# Patient Record
Sex: MALE | Race: WHITE | Employment: FULL TIME | URBAN - METROPOLITAN AREA
[De-identification: names, ages, dates, MRNs, and addresses within clinical notes are randomized per-mention and may not be internally consistent; named-entity substitution may affect disease eponyms.]

---

## 2019-09-16 ENCOUNTER — APPOINTMENT (OUTPATIENT)
Dept: GENERAL RADIOLOGY | Age: 24
End: 2019-09-16
Payer: COMMERCIAL

## 2019-09-16 ENCOUNTER — HOSPITAL ENCOUNTER (EMERGENCY)
Age: 24
Discharge: HOME | End: 2019-09-16
Attending: EMERGENCY MEDICINE
Payer: COMMERCIAL

## 2019-09-16 VITALS
RESPIRATION RATE: 14 BRPM | HEIGHT: 76 IN | SYSTOLIC BLOOD PRESSURE: 125 MMHG | OXYGEN SATURATION: 98 % | HEART RATE: 81 BPM | BODY MASS INDEX: 24.26 KG/M2 | DIASTOLIC BLOOD PRESSURE: 72 MMHG | TEMPERATURE: 99.1 F | WEIGHT: 199.2 LBS

## 2019-09-16 DIAGNOSIS — S60.222A CONTUSION OF LEFT HAND, INITIAL ENCOUNTER: Primary | ICD-10-CM

## 2019-09-16 PROCEDURE — 99283 EMERGENCY DEPT VISIT LOW MDM: CPT

## 2019-09-16 PROCEDURE — 73130 X-RAY EXAM OF HAND: CPT

## 2019-09-16 PROCEDURE — 6370000000 HC RX 637 (ALT 250 FOR IP): Performed by: EMERGENCY MEDICINE

## 2019-09-16 RX ADMIN — IBUPROFEN 600 MG: 200 TABLET, FILM COATED ORAL at 15:44

## 2019-09-16 ASSESSMENT — PAIN DESCRIPTION - LOCATION: LOCATION: HAND

## 2019-09-16 ASSESSMENT — PAIN SCALES - GENERAL
PAINLEVEL_OUTOF10: 2
PAINLEVEL_OUTOF10: 2

## 2019-09-16 ASSESSMENT — PAIN DESCRIPTION - ORIENTATION: ORIENTATION: LEFT

## 2019-09-16 ASSESSMENT — PAIN DESCRIPTION - PAIN TYPE: TYPE: ACUTE PAIN

## 2019-09-16 NOTE — ED PROVIDER NOTES
CHIEF COMPLAINT  Hand Pain      HISTORY OF PRESENT ILLNESS  Gsiele Miller is a 30 y.o. male, who presents to the ED with moderate left hand pain after accidental injury last night patient was carrying a laundry basket and tripped hitting his left hand against the door complains of moderate pain in the hand the base of the second and third fingers no weakness no numbness no paresthesias. No other complaints,modifying factors or associated symptoms. Review of Systems    I have reviewed the following from the nursing documentation. History reviewed. No pertinent past medical history. History reviewed. No pertinent surgical history. History reviewed. No pertinent family history.   Social History     Socioeconomic History    Marital status: Single     Spouse name: Not on file    Number of children: Not on file    Years of education: Not on file    Highest education level: Not on file   Occupational History    Not on file   Social Needs    Financial resource strain: Not on file    Food insecurity:     Worry: Not on file     Inability: Not on file    Transportation needs:     Medical: Not on file     Non-medical: Not on file   Tobacco Use    Smoking status: Never Smoker    Smokeless tobacco: Current User     Types: Snuff   Substance and Sexual Activity    Alcohol use: Yes     Comment: 5-6 drinks weekly    Drug use: Never    Sexual activity: Not on file   Lifestyle    Physical activity:     Days per week: Not on file     Minutes per session: Not on file    Stress: Not on file   Relationships    Social connections:     Talks on phone: Not on file     Gets together: Not on file     Attends Mu-ism service: Not on file     Active member of club or organization: Not on file     Attends meetings of clubs or organizations: Not on file     Relationship status: Not on file    Intimate partner violence:     Fear of current or ex partner: Not on file     Emotionally abused: Not on file     Physically

## 2020-05-01 ENCOUNTER — HOSPITAL ENCOUNTER (EMERGENCY)
Age: 25
Discharge: HOME | End: 2020-05-01
Payer: COMMERCIAL

## 2020-05-01 ENCOUNTER — APPOINTMENT (OUTPATIENT)
Dept: GENERAL RADIOLOGY | Age: 25
End: 2020-05-01
Payer: COMMERCIAL

## 2020-05-01 VITALS
SYSTOLIC BLOOD PRESSURE: 126 MMHG | DIASTOLIC BLOOD PRESSURE: 78 MMHG | OXYGEN SATURATION: 100 % | HEART RATE: 88 BPM | TEMPERATURE: 98.2 F | RESPIRATION RATE: 16 BRPM

## 2020-05-01 PROCEDURE — 99283 EMERGENCY DEPT VISIT LOW MDM: CPT

## 2020-05-01 PROCEDURE — 73140 X-RAY EXAM OF FINGER(S): CPT

## 2020-05-01 RX ORDER — IBUPROFEN 600 MG/1
600 TABLET ORAL ONCE
Status: DISCONTINUED | OUTPATIENT
Start: 2020-05-01 | End: 2020-05-01 | Stop reason: HOSPADM

## 2020-05-01 RX ORDER — IBUPROFEN 600 MG/1
600 TABLET ORAL EVERY 6 HOURS PRN
Qty: 30 TABLET | Refills: 0 | Status: SHIPPED | OUTPATIENT
Start: 2020-05-01 | End: 2021-10-15

## 2020-05-01 ASSESSMENT — PAIN DESCRIPTION - PAIN TYPE: TYPE: ACUTE PAIN

## 2020-05-01 ASSESSMENT — PAIN DESCRIPTION - DESCRIPTORS: DESCRIPTORS: ACHING

## 2020-05-01 ASSESSMENT — PAIN DESCRIPTION - ORIENTATION: ORIENTATION: LEFT

## 2020-05-01 ASSESSMENT — PAIN SCALES - GENERAL: PAINLEVEL_OUTOF10: 5

## 2020-05-01 ASSESSMENT — PAIN DESCRIPTION - LOCATION: LOCATION: FINGER (COMMENT WHICH ONE)

## 2020-05-01 NOTE — ED PROVIDER NOTES
[address] [facility]  eMERGENCY dEPARTMENT eNCOUnter        Pt Name: Chela Calvo  MRN: [id number]  [place] 1995  Date of evaluation: 5/1/2020  Provider: Nataliia Pal PA-C  PCP: No primary care provider on file. ED Attending: Patricia Cutler MD    Patient was not seen by the ED attending  History is provided by the patient    CHIEF COMPLAINT:  Finger Pain (left ring finger pain)      HISTORY OF PRESENT ILLNESS:  Chela Calvo is a 30 y.o. male who presents to the ED via private vehicle with complaints of finger injury. The patient reports accidentally closing his left ring finger in a car door approximately 1 hour prior to arrival.  He states he initially had severe pain and some swelling to the digit. He admits that symptoms have started to improve. Pain is more moderate at this time and is rated 5/10 in triage. Pain is described as both \"aching\" as well as \"burning\". There is no break in the skin from this event. He reports being right-hand dominant. He took nothing for symptom relief prior to coming to the ED. No other complaints or injuries, modifying factors or associated symptoms. Nursing notes reviewed. History reviewed. No pertinent past medical history. History reviewed. No pertinent surgical history. History reviewed. No pertinent family history.   Social History     Socioeconomic History    Marital status: Single     Spouse name: Not on file    Number of children: Not on file    Years of education: Not on file    Highest education level: Not on file   Occupational History    Not on file   Social Needs    Financial resource strain: Not on file    Food insecurity     Worry: Not on file     Inability: Not on file    Transportation needs     Medical: Not on file     Non-medical: Not on file   Tobacco Use    Smoking status: Never Smoker    Smokeless tobacco: Current User     Types: Snuff   Substance and Sexual Activity    Alcohol use: Yes     Comment: 5-6 drinks weekly  Drug use: Never    Sexual activity: Not on file   Lifestyle    Physical activity     Days per week: Not on file     Minutes per session: Not on file    Stress: Not on file   Relationships    Social connections     Talks on phone: Not on file     Gets together: Not on file     Attends Pentecostalism service: Not on file     Active member of club or organization: Not on file     Attends meetings of clubs or organizations: Not on file     Relationship status: Not on file    Intimate partner violence     Fear of current or ex partner: Not on file     Emotionally abused: Not on file     Physically abused: Not on file     Forced sexual activity: Not on file   Other Topics Concern    Not on file   Social History Narrative    Not on file     Current Facility-Administered Medications   Medication Dose Route Frequency Provider Last Rate Last Dose    ibuprofen (ADVIL;MOTRIN) tablet 600 mg  600 mg Oral Once NIKITA Ragsdale         Current Outpatient Medications   Medication Sig Dispense Refill    ibuprofen (IBU) 600 MG tablet Take 1 tablet by mouth every 6 hours as needed for Pain 30 tablet 0     No Known Allergies    REVIEW OF SYSTEMS:  6 systems reviewed, pertinent positives per HPI otherwise noted to be negative. PHYSICAL EXAM:  /78   Pulse 88   Temp 98.2 °F (36.8 °C) (Oral)   Resp 16   SpO2 100%   CONSTITUTIONAL: Awake and alert. Well-developed. Well-nourished. Non-toxic. Cooperative. No acute distress. HENT: Normocephalic. Atraumatic. External ears normal, without discharge. Nose normal. Mucous membranes moist.  EYES: Conjunctiva non-injected. No scleral icterus. PERRL. EOM's grossly intact. NECK: Supple. Normal ROM. CARDIOVASCULAR: Normal heart rate. Intact distal pulses. PULMONARY/CHEST WALL: Breathing is unlabored. Equal, symmetric chest rise. Speaking comfortably in full sentences. ABDOMEN: Nondistended  MUSKULOSKELETAL: Left hand: No deformities.   Tenderness to the ring finger at the middle phalanx. No significant soft tissue swelling noted. No crepitus to palpate. Normal ROM. SKIN: Warm and dry. Skin intact. NEUROLOGICAL: Alert and oriented x 3. Strength is 5/5 in all extremities and sensation is intact. PSYCHIATRIC: Normal affect    Labs:    None    RADIOLOGY:    All x-ray studies are viewed/reviewed by me. Formal interpretations per the radiologist are as follows:    Xr Finger Left (min 2 Views)    Result Date: 5/1/2020  LEFT FOURTH FINGER 3 VIEWS INDICATION: Injury. FINDINGS: 3 views of the left fourth finger were obtained. There is no evidence of any fracture or dislocation. Joint spaces are maintained. 1. No acute osseous abnormality. ED COURSE/MDM:  Patient was given the following medications:  Medications   ibuprofen (ADVIL;MOTRIN) tablet 600 mg (600 mg Oral Not Given 5/1/20 0344)       I have evaluated this patient here in the ED. patient describes closing his left ring finger in a car door. Outwardly there is no real objective sign of injury. He is tender over the middle phalanx of the left ring finger. An x-ray of the digit is negative. Patient given ibuprofen in the ED and he will be discharged on this. I recommended ice. He can follow-up with primary care as needed and he will be referral to that end. Patient was given scripts for the following medications. I counseled patient how to take these medications. Discharge Medication List as of 5/1/2020  2:42 PM      START taking these medications    Details   ibuprofen (IBU) 600 MG tablet Take 1 tablet by mouth every 6 hours as needed for Pain, Disp-30 tablet, R-0Print           I estimate there is LOW risk for ACUTE FRACTURE, COMPARTMENT SYNDROME, DEEP VENOUS THROMBOSIS, SEPTIC ARTHRITIS, TENDON OR NEUROVASCULAR INJURY, thus I consider the discharge disposition reasonable.  Maricarmen Owens and I have discussed the diagnosis and risks, and we agree with discharging home to follow-up with their primary doctor or the referral orthopedist. We also discussed returning to the Emergency Department immediately if new or worsening symptoms occur. We have discussed the symptoms which are most concerning (e.g., changing or worsening pain, numbness, weakness) that necessitate immediate return. CLINICAL IMPRESSION:  1. Contusion of left ring finger without damage to nail, initial encounter        Blood pressure 126/78, pulse 88, temperature 98.2 °F (36.8 °C), temperature source Oral, resp. rate 16, SpO2 100 %. PATIENT REFERRED TO:  Tyson Hernandez  [phone]            DISPOSITION  Patient was discharged to home in good condition.           Kuldeep Moranma  05/01/20 1301

## 2020-07-23 ENCOUNTER — OFFICE VISIT (OUTPATIENT)
Dept: PRIMARY CARE CLINIC | Age: 25
End: 2020-07-23
Payer: COMMERCIAL

## 2020-07-23 PROCEDURE — 99211 OFF/OP EST MAY X REQ PHY/QHP: CPT | Performed by: NURSE PRACTITIONER

## 2020-07-23 NOTE — PROGRESS NOTES
Raul Holden received a viral test for COVID-19. They were educated on isolation and quarantine as appropriate. For any symptoms, they were directed to seek care from their PCP, given contact information to establish with a doctor, directed to an urgent care or the emergency room.

## 2020-07-28 LAB
SARS-COV-2: NOT DETECTED
SOURCE: NORMAL

## 2020-10-06 ENCOUNTER — HOSPITAL ENCOUNTER (EMERGENCY)
Age: 25
Discharge: HOME | End: 2020-10-06
Attending: EMERGENCY MEDICINE
Payer: COMMERCIAL

## 2020-10-06 VITALS
WEIGHT: 199 LBS | OXYGEN SATURATION: 99 % | TEMPERATURE: 98.2 F | RESPIRATION RATE: 12 BRPM | DIASTOLIC BLOOD PRESSURE: 86 MMHG | BODY MASS INDEX: 24.23 KG/M2 | HEART RATE: 87 BPM | HEIGHT: 76 IN | SYSTOLIC BLOOD PRESSURE: 141 MMHG

## 2020-10-06 PROCEDURE — 99282 EMERGENCY DEPT VISIT SF MDM: CPT

## 2020-10-06 PROCEDURE — 6360000002 HC RX W HCPCS: Performed by: EMERGENCY MEDICINE

## 2020-10-06 PROCEDURE — 96372 THER/PROPH/DIAG INJ SC/IM: CPT

## 2020-10-06 RX ORDER — KETOROLAC TROMETHAMINE 30 MG/ML
60 INJECTION, SOLUTION INTRAMUSCULAR; INTRAVENOUS ONCE
Status: COMPLETED | OUTPATIENT
Start: 2020-10-06 | End: 2020-10-06

## 2020-10-06 RX ORDER — NAPROXEN 500 MG/1
500 TABLET ORAL 2 TIMES DAILY
Qty: 20 TABLET | Refills: 0 | Status: SHIPPED | OUTPATIENT
Start: 2020-10-06 | End: 2021-10-15

## 2020-10-06 RX ORDER — TRAMADOL HYDROCHLORIDE 50 MG/1
50 TABLET ORAL EVERY 6 HOURS PRN
Qty: 10 TABLET | Refills: 0 | Status: SHIPPED | OUTPATIENT
Start: 2020-10-06 | End: 2020-10-09

## 2020-10-06 RX ORDER — METHOCARBAMOL 750 MG/1
750 TABLET, FILM COATED ORAL 3 TIMES DAILY
Qty: 15 TABLET | Refills: 0 | Status: SHIPPED | OUTPATIENT
Start: 2020-10-06 | End: 2020-10-11

## 2020-10-06 RX ADMIN — KETOROLAC TROMETHAMINE 60 MG: 30 INJECTION, SOLUTION INTRAMUSCULAR at 19:25

## 2020-10-06 ASSESSMENT — PAIN SCALES - GENERAL
PAINLEVEL_OUTOF10: 6
PAINLEVEL_OUTOF10: 6

## 2020-10-06 ASSESSMENT — PAIN DESCRIPTION - LOCATION: LOCATION: SHOULDER

## 2020-10-06 ASSESSMENT — PAIN DESCRIPTION - ORIENTATION: ORIENTATION: LEFT

## 2020-10-06 NOTE — ED TRIAGE NOTES
Patient states he was tightening something at work and felt pain to left shoulder. Had surgery to this shoulder in January of this year.

## 2020-10-06 NOTE — ED PROVIDER NOTES
[facility] EMERGENCY DEPT VISIT      Patient Identification  Krystian Rodriguez is a 30 y.o. male. Chief Complaint   Shoulder Pain      History of Present Illness: This is a  30 y.o. male who presents ambulatory  to the ED with complaints of left shoulder pain. Patient states that last winter he had a shoulder labrum repair done. He had been doing well until today at work when he pulled hard on a handle and felt immediate pain from shoulder down arm. Hurts to abduct arm now. No numbness or weakness. Took flexeril without relief. No chest or neck pain. History reviewed. No pertinent past medical history. Past Surgical History:   Procedure Laterality Date    SHOULDER SURGERY         No current facility-administered medications for this encounter. Current Outpatient Medications:     naproxen (NAPROSYN) 500 MG tablet, Take 1 tablet by mouth 2 times daily for 10 days, Disp: 20 tablet, Rfl: 0    methocarbamol (ROBAXIN-750) 750 MG tablet, Take 1 tablet by mouth 3 times daily for 5 days, Disp: 15 tablet, Rfl: 0    traMADol (ULTRAM) 50 MG tablet, Take 1 tablet by mouth every 6 hours as needed for Pain for up to 3 days. , Disp: 10 tablet, Rfl: 0    ibuprofen (IBU) 600 MG tablet, Take 1 tablet by mouth every 6 hours as needed for Pain, Disp: 30 tablet, Rfl: 0    No Known Allergies    Social History     Socioeconomic History    Marital status: Single     Spouse name: Not on file    Number of children: Not on file    Years of education: Not on file    Highest education level: Not on file   Occupational History    Not on file   Social Needs    Financial resource strain: Not on file    Food insecurity     Worry: Not on file     Inability: Not on file    Transportation needs     Medical: Not on file     Non-medical: Not on file   Tobacco Use    Smoking status: Never Smoker    Smokeless tobacco: Current User     Types: Snuff   Substance and Sexual Activity    Alcohol use: Yes     Comment: 5-6 drinks weekly    Drug use: Never    Sexual activity: Not on file   Lifestyle    Physical activity     Days per week: Not on file     Minutes per session: Not on file    Stress: Not on file   Relationships    Social connections     Talks on phone: Not on file     Gets together: Not on file     Attends Christian service: Not on file     Active member of club or organization: Not on file     Attends meetings of clubs or organizations: Not on file     Relationship status: Not on file    Intimate partner violence     Fear of current or ex partner: Not on file     Emotionally abused: Not on file     Physically abused: Not on file     Forced sexual activity: Not on file   Other Topics Concern    Not on file   Social History Narrative    Not on file       Nursing Notes Reviewed      ROS:  General: no fever  RESP: no cough, no shortness of breath  Musculoskeletal: + arthralgia, no myalgia, no back pain, no neck pain, no joint swelling  NEURO: no headache, no numbness, no weakness  DERM: no rash, no erythema, no ecchymosis, no wounds      PHYSICAL EXAM:  GENERAL APPEARANCE: Radha Guzman is in no acute respiratory distress. Awake and alert. VITAL SIGNS:   ED Triage Vitals [10/06/20 1850]   Enc Vitals Group      BP (!) 141/86      Pulse 87      Resp 12      Temp 98.2 °F (36.8 °C)      Temp Source Oral      SpO2 99 %      Weight 199 lb (90.3 kg)      Height 6' 4\" (1.93 m)      Head Circumference       Peak Flow       Pain Score       Pain Loc       Pain Edu? Excl. in [address]? HEAD: Normocephalic, atraumatic. EYES:  Extraocular muscles are intact. Conjunctivas are pink. Negative scleral icterus. ENT:  Mucous membranes are moist.     NECK: Nontender and supple. CHEST: Clear to auscultation bilaterally. No rales, rhonchi, or wheezing. Chest wall is nontender  HEART:  Regular rate and rhythm. No murmurs. Strong and equal pulses in the upper and lower extremities. ABDOMEN: Soft,  nondistended, positive bowel sounds.  abdomen is nontender. MUSCULOSKELETAL:  Active range of motion of the upper and lower extremities. No edema. Left shoulder tender over anterior joint line. Active abduction markedly reduced. Pain with passive abduction to 50 degrees. Lesser degree of pain with flexion and extension of shoulder. No scapular tenderness. No midline cervical or thoracic spine tenderness. Strong radial pulse. Good ROM at elbow and wrist with full strength  NEUROLOGICAL: Awake, alert and oriented x 3. Power intact in the upper and lower extremities. Sensation intact. DERMATOLOGIC: No petechiae, rashes, or ecchymoses. ED COURSE AND MEDICAL DECISION MAKING:      Treatment in the department:  Patient received   Medications   ketorolac (TORADOL) injection 60 mg (60 mg Intramuscular Given 10/6/20 1925)     He reports having a sling and swathe at home to use    Medical decision making:  Patient presents the emergency department with acute left shoulder pain after pulling on a heavy lever. There was no direct trauma to the shoulder or fall. There is no evidence of dislocation on exam.  He is neurovascular intact distally. He does have decreased range of motion primarily on abduction and may have suffered a rotator cuff tear or recurrent injury to his labrum or disruption of prior surgical treatment. I did not feel Nadara Fall would be helpful as not expecting fracture or dislocation. Will refer back to dr Britt Norwood.  Patient understands MRI may be needed if pain persists    I estimate there is LOW risk for FRACTURE, DISLOCATION, COMPARTMENT SYNDROME, DEEP VENOUS THROMBOSIS, SEPTIC ARTHRITIS, OSTEOMYELITIS, TENDON OR NEUROVASCULAR INJURY, thus I consider the discharge disposition reasonable.  Jonas Carrera and I have discussed the diagnosis and risks, and we agree with discharging home to follow-up with their primary doctor or the referral orthopedist. We also discussed returning to the Emergency Department immediately if new or worsening symptoms occur. Clinical Impression:  1. Left shoulder strain, initial encounter        Dispo:  Patient will be discharged  at this time. Patient was informed of this decision and agrees with plan. I have discussed lab and xray findings with patient and they understand. Questions were answered to the best of my ability. Discharge vitals:  Blood pressure (!) 141/86, pulse 87, temperature 98.2 °F (36.8 °C), temperature source Oral, resp. rate 12, height 6' 4\" (1.93 m), weight 199 lb (90.3 kg), SpO2 99 %. Prescriptions given:   Discharge Medication List as of 10/6/2020  7:47 PM      START taking these medications    Details   naproxen (NAPROSYN) 500 MG tablet Take 1 tablet by mouth 2 times daily for 10 days, Disp-20 tablet,R-0Print      methocarbamol (ROBAXIN-750) 750 MG tablet Take 1 tablet by mouth 3 times daily for 5 days, Disp-15 tablet,R-0Print      traMADol (ULTRAM) 50 MG tablet Take 1 tablet by mouth every 6 hours as needed for Pain for up to 3 days. , Disp-10 tablet,R-0Print               This chart was created using dragon voice recognition software.         Wen Sun MD  10/07/20 2202

## 2021-10-15 ENCOUNTER — APPOINTMENT (OUTPATIENT)
Dept: CT IMAGING | Age: 26
DRG: 387 | End: 2021-10-15
Payer: COMMERCIAL

## 2021-10-15 ENCOUNTER — HOSPITAL ENCOUNTER (INPATIENT)
Age: 26
LOS: 2 days | Discharge: HOME | DRG: 387 | End: 2021-10-17
Attending: INTERNAL MEDICINE | Admitting: INTERNAL MEDICINE
Payer: COMMERCIAL

## 2021-10-15 DIAGNOSIS — R10.9 ABDOMINAL PAIN, UNSPECIFIED ABDOMINAL LOCATION: Primary | ICD-10-CM

## 2021-10-15 DIAGNOSIS — R11.2 INTRACTABLE VOMITING WITH NAUSEA, UNSPECIFIED VOMITING TYPE: ICD-10-CM

## 2021-10-15 LAB
A/G RATIO: 1.7 (ref 1.1–2.2)
ALBUMIN SERPL-MCNC: 4.4 G/DL (ref 3.4–5)
ALP BLD-CCNC: 76 U/L (ref 40–129)
ALT SERPL-CCNC: 56 U/L (ref 10–40)
ANION GAP SERPL CALCULATED.3IONS-SCNC: 12 MMOL/L (ref 3–16)
AST SERPL-CCNC: 31 U/L (ref 15–37)
BASOPHILS ABSOLUTE: 0.1 K/UL (ref 0–0.2)
BASOPHILS RELATIVE PERCENT: 0.5 %
BILIRUB SERPL-MCNC: 0.6 MG/DL (ref 0–1)
BILIRUBIN URINE: NEGATIVE
BLOOD, URINE: NEGATIVE
BUN BLDV-MCNC: 10 MG/DL (ref 7–20)
C-REACTIVE PROTEIN: <3 MG/L (ref 0–5.1)
CALCIUM SERPL-MCNC: 9.3 MG/DL (ref 8.3–10.6)
CHLORIDE BLD-SCNC: 101 MMOL/L (ref 99–110)
CLARITY: CLEAR
CO2: 25 MMOL/L (ref 21–32)
COLOR: YELLOW
CREAT SERPL-MCNC: 1 MG/DL (ref 0.9–1.3)
EOSINOPHILS ABSOLUTE: 0 K/UL (ref 0–0.6)
EOSINOPHILS RELATIVE PERCENT: 0.1 %
GFR AFRICAN AMERICAN: >60
GFR NON-AFRICAN AMERICAN: >60
GLOBULIN: 2.6 G/DL
GLUCOSE BLD-MCNC: 87 MG/DL (ref 70–99)
GLUCOSE URINE: NEGATIVE MG/DL
HCT VFR BLD CALC: 48.3 % (ref 40.5–52.5)
HEMOGLOBIN: 16.3 G/DL (ref 13.5–17.5)
KETONES, URINE: NEGATIVE MG/DL
LEUKOCYTE ESTERASE, URINE: NEGATIVE
LIPASE: 25 U/L (ref 13–60)
LYMPHOCYTES ABSOLUTE: 3.6 K/UL (ref 1–5.1)
LYMPHOCYTES RELATIVE PERCENT: 32.7 %
MCH RBC QN AUTO: 30.6 PG (ref 26–34)
MCHC RBC AUTO-ENTMCNC: 33.7 G/DL (ref 31–36)
MCV RBC AUTO: 90.9 FL (ref 80–100)
MICROSCOPIC EXAMINATION: NORMAL
MONOCYTES ABSOLUTE: 0.8 K/UL (ref 0–1.3)
MONOCYTES RELATIVE PERCENT: 6.8 %
NEUTROPHILS ABSOLUTE: 6.6 K/UL (ref 1.7–7.7)
NEUTROPHILS RELATIVE PERCENT: 59.9 %
NITRITE, URINE: NEGATIVE
PDW BLD-RTO: 13.3 % (ref 12.4–15.4)
PH UA: 6.5 (ref 5–8)
PLATELET # BLD: 241 K/UL (ref 135–450)
PMV BLD AUTO: 7.6 FL (ref 5–10.5)
POTASSIUM REFLEX MAGNESIUM: 4 MMOL/L (ref 3.5–5.1)
PROTEIN UA: NEGATIVE MG/DL
RBC # BLD: 5.31 M/UL (ref 4.2–5.9)
SEDIMENTATION RATE, ERYTHROCYTE: 4 MM/HR (ref 0–15)
SODIUM BLD-SCNC: 138 MMOL/L (ref 136–145)
SPECIFIC GRAVITY UA: >1.03 (ref 1–1.03)
TOTAL PROTEIN: 7 G/DL (ref 6.4–8.2)
URINE REFLEX TO CULTURE: NORMAL
URINE TYPE: NORMAL
UROBILINOGEN, URINE: 0.2 E.U./DL
WBC # BLD: 11.1 K/UL (ref 4–11)

## 2021-10-15 PROCEDURE — 2580000003 HC RX 258: Performed by: INTERNAL MEDICINE

## 2021-10-15 PROCEDURE — 6360000002 HC RX W HCPCS: Performed by: INTERNAL MEDICINE

## 2021-10-15 PROCEDURE — 86140 C-REACTIVE PROTEIN: CPT

## 2021-10-15 PROCEDURE — 96375 TX/PRO/DX INJ NEW DRUG ADDON: CPT

## 2021-10-15 PROCEDURE — 99284 EMERGENCY DEPT VISIT MOD MDM: CPT

## 2021-10-15 PROCEDURE — 81003 URINALYSIS AUTO W/O SCOPE: CPT

## 2021-10-15 PROCEDURE — 80053 COMPREHEN METABOLIC PANEL: CPT

## 2021-10-15 PROCEDURE — 85652 RBC SED RATE AUTOMATED: CPT

## 2021-10-15 PROCEDURE — 6360000004 HC RX CONTRAST MEDICATION: Performed by: PHYSICIAN ASSISTANT

## 2021-10-15 PROCEDURE — 1200000000 HC SEMI PRIVATE

## 2021-10-15 PROCEDURE — 83690 ASSAY OF LIPASE: CPT

## 2021-10-15 PROCEDURE — 6360000002 HC RX W HCPCS: Performed by: PHYSICIAN ASSISTANT

## 2021-10-15 PROCEDURE — 6370000000 HC RX 637 (ALT 250 FOR IP): Performed by: INTERNAL MEDICINE

## 2021-10-15 PROCEDURE — 36415 COLL VENOUS BLD VENIPUNCTURE: CPT

## 2021-10-15 PROCEDURE — 96374 THER/PROPH/DIAG INJ IV PUSH: CPT

## 2021-10-15 PROCEDURE — 2580000003 HC RX 258: Performed by: PHYSICIAN ASSISTANT

## 2021-10-15 PROCEDURE — 74177 CT ABD & PELVIS W/CONTRAST: CPT

## 2021-10-15 PROCEDURE — 85025 COMPLETE CBC W/AUTO DIFF WBC: CPT

## 2021-10-15 RX ORDER — ACETAMINOPHEN 325 MG/1
650 TABLET ORAL EVERY 6 HOURS PRN
Status: DISCONTINUED | OUTPATIENT
Start: 2021-10-15 | End: 2021-10-17 | Stop reason: HOSPADM

## 2021-10-15 RX ORDER — MORPHINE SULFATE 2 MG/ML
2 INJECTION, SOLUTION INTRAMUSCULAR; INTRAVENOUS ONCE
Status: COMPLETED | OUTPATIENT
Start: 2021-10-15 | End: 2021-10-15

## 2021-10-15 RX ORDER — SODIUM CHLORIDE 0.9 % (FLUSH) 0.9 %
5-40 SYRINGE (ML) INJECTION EVERY 12 HOURS SCHEDULED
Status: DISCONTINUED | OUTPATIENT
Start: 2021-10-15 | End: 2021-10-17 | Stop reason: HOSPADM

## 2021-10-15 RX ORDER — 0.9 % SODIUM CHLORIDE 0.9 %
1000 INTRAVENOUS SOLUTION INTRAVENOUS ONCE
Status: COMPLETED | OUTPATIENT
Start: 2021-10-15 | End: 2021-10-15

## 2021-10-15 RX ORDER — ONDANSETRON 4 MG/1
4 TABLET, ORALLY DISINTEGRATING ORAL EVERY 8 HOURS PRN
Status: DISCONTINUED | OUTPATIENT
Start: 2021-10-15 | End: 2021-10-17 | Stop reason: HOSPADM

## 2021-10-15 RX ORDER — SODIUM CHLORIDE 0.9 % (FLUSH) 0.9 %
5-40 SYRINGE (ML) INJECTION PRN
Status: DISCONTINUED | OUTPATIENT
Start: 2021-10-15 | End: 2021-10-17 | Stop reason: HOSPADM

## 2021-10-15 RX ORDER — ACETAMINOPHEN 650 MG/1
650 SUPPOSITORY RECTAL EVERY 6 HOURS PRN
Status: DISCONTINUED | OUTPATIENT
Start: 2021-10-15 | End: 2021-10-17 | Stop reason: HOSPADM

## 2021-10-15 RX ORDER — PREDNISONE 10 MG/1
40 TABLET ORAL DAILY
COMMUNITY

## 2021-10-15 RX ORDER — METHYLPREDNISOLONE SODIUM SUCCINATE 125 MG/2ML
60 INJECTION, POWDER, LYOPHILIZED, FOR SOLUTION INTRAMUSCULAR; INTRAVENOUS ONCE
Status: DISCONTINUED | OUTPATIENT
Start: 2021-10-15 | End: 2021-10-15

## 2021-10-15 RX ORDER — METHYLPREDNISOLONE SODIUM SUCCINATE 40 MG/ML
20 INJECTION, POWDER, LYOPHILIZED, FOR SOLUTION INTRAMUSCULAR; INTRAVENOUS EVERY 8 HOURS
Status: DISCONTINUED | OUTPATIENT
Start: 2021-10-15 | End: 2021-10-17 | Stop reason: HOSPADM

## 2021-10-15 RX ORDER — ACETAMINOPHEN 500 MG
500 TABLET ORAL EVERY 6 HOURS PRN
COMMUNITY

## 2021-10-15 RX ORDER — METHYLPREDNISOLONE SODIUM SUCCINATE 125 MG/2ML
125 INJECTION, POWDER, LYOPHILIZED, FOR SOLUTION INTRAMUSCULAR; INTRAVENOUS ONCE
Status: DISCONTINUED | OUTPATIENT
Start: 2021-10-15 | End: 2021-10-15

## 2021-10-15 RX ORDER — MORPHINE SULFATE 4 MG/ML
4 INJECTION, SOLUTION INTRAMUSCULAR; INTRAVENOUS ONCE
Status: COMPLETED | OUTPATIENT
Start: 2021-10-15 | End: 2021-10-15

## 2021-10-15 RX ORDER — SODIUM CHLORIDE 9 MG/ML
25 INJECTION, SOLUTION INTRAVENOUS PRN
Status: DISCONTINUED | OUTPATIENT
Start: 2021-10-15 | End: 2021-10-17 | Stop reason: HOSPADM

## 2021-10-15 RX ORDER — HYDROCODONE BITARTRATE AND ACETAMINOPHEN 5; 325 MG/1; MG/1
1 TABLET ORAL EVERY 6 HOURS PRN
Status: DISCONTINUED | OUTPATIENT
Start: 2021-10-15 | End: 2021-10-17 | Stop reason: HOSPADM

## 2021-10-15 RX ORDER — POLYETHYLENE GLYCOL 3350 17 G/17G
17 POWDER, FOR SOLUTION ORAL DAILY PRN
Status: DISCONTINUED | OUTPATIENT
Start: 2021-10-15 | End: 2021-10-17 | Stop reason: HOSPADM

## 2021-10-15 RX ORDER — PROMETHAZINE HYDROCHLORIDE 12.5 MG/1
12.5 TABLET ORAL EVERY 6 HOURS PRN
Status: ON HOLD | COMMUNITY
End: 2021-10-17 | Stop reason: SDUPTHER

## 2021-10-15 RX ORDER — ONDANSETRON 2 MG/ML
4 INJECTION INTRAMUSCULAR; INTRAVENOUS EVERY 6 HOURS PRN
Status: DISCONTINUED | OUTPATIENT
Start: 2021-10-15 | End: 2021-10-17 | Stop reason: HOSPADM

## 2021-10-15 RX ORDER — ONDANSETRON 2 MG/ML
4 INJECTION INTRAMUSCULAR; INTRAVENOUS ONCE
Status: COMPLETED | OUTPATIENT
Start: 2021-10-15 | End: 2021-10-15

## 2021-10-15 RX ADMIN — SODIUM CHLORIDE, PRESERVATIVE FREE 10 ML: 5 INJECTION INTRAVENOUS at 21:27

## 2021-10-15 RX ADMIN — MORPHINE SULFATE 2 MG: 2 INJECTION, SOLUTION INTRAMUSCULAR; INTRAVENOUS at 16:00

## 2021-10-15 RX ADMIN — METHYLPREDNISOLONE SODIUM SUCCINATE 60 MG: 125 INJECTION, POWDER, FOR SOLUTION INTRAMUSCULAR; INTRAVENOUS at 14:50

## 2021-10-15 RX ADMIN — Medication 25 MG: at 15:32

## 2021-10-15 RX ADMIN — HYDROCODONE BITARTRATE AND ACETAMINOPHEN 1 TABLET: 5; 325 TABLET ORAL at 21:26

## 2021-10-15 RX ADMIN — IOPAMIDOL 75 ML: 755 INJECTION, SOLUTION INTRAVENOUS at 11:51

## 2021-10-15 RX ADMIN — ONDANSETRON 4 MG: 2 INJECTION INTRAMUSCULAR; INTRAVENOUS at 21:26

## 2021-10-15 RX ADMIN — ONDANSETRON 4 MG: 2 INJECTION INTRAMUSCULAR; INTRAVENOUS at 11:45

## 2021-10-15 RX ADMIN — MORPHINE SULFATE 4 MG: 4 INJECTION INTRAVENOUS at 11:44

## 2021-10-15 RX ADMIN — METHYLPREDNISOLONE SODIUM SUCCINATE 20 MG: 40 INJECTION, POWDER, FOR SOLUTION INTRAMUSCULAR; INTRAVENOUS at 23:52

## 2021-10-15 RX ADMIN — SODIUM CHLORIDE 1000 ML: 9 INJECTION, SOLUTION INTRAVENOUS at 11:44

## 2021-10-15 ASSESSMENT — PAIN DESCRIPTION - PAIN TYPE
TYPE: ACUTE PAIN

## 2021-10-15 ASSESSMENT — ENCOUNTER SYMPTOMS
EYE REDNESS: 0
VOMITING: 1
CHOKING: 0
ABDOMINAL PAIN: 1
NAUSEA: 1
SHORTNESS OF BREATH: 0
APNEA: 0
EYE DISCHARGE: 0
FACIAL SWELLING: 0
BACK PAIN: 0

## 2021-10-15 ASSESSMENT — PAIN SCALES - GENERAL
PAINLEVEL_OUTOF10: 9
PAINLEVEL_OUTOF10: 5
PAINLEVEL_OUTOF10: 6
PAINLEVEL_OUTOF10: 8

## 2021-10-15 ASSESSMENT — PAIN DESCRIPTION - LOCATION
LOCATION: ABDOMEN

## 2021-10-15 ASSESSMENT — PAIN DESCRIPTION - DESCRIPTORS
DESCRIPTORS: BURNING

## 2021-10-15 ASSESSMENT — PAIN DESCRIPTION - ORIENTATION
ORIENTATION: LOWER

## 2021-10-15 ASSESSMENT — PAIN - FUNCTIONAL ASSESSMENT
PAIN_FUNCTIONAL_ASSESSMENT: PREVENTS OR INTERFERES SOME ACTIVE ACTIVITIES AND ADLS
PAIN_FUNCTIONAL_ASSESSMENT: PREVENTS OR INTERFERES SOME ACTIVE ACTIVITIES AND ADLS

## 2021-10-15 ASSESSMENT — PAIN DESCRIPTION - ONSET
ONSET: ON-GOING
ONSET: SUDDEN
ONSET: ON-GOING

## 2021-10-15 ASSESSMENT — PAIN DESCRIPTION - FREQUENCY
FREQUENCY: CONTINUOUS

## 2021-10-15 ASSESSMENT — PAIN DESCRIPTION - PROGRESSION
CLINICAL_PROGRESSION: NOT CHANGED
CLINICAL_PROGRESSION: NOT CHANGED
CLINICAL_PROGRESSION: GRADUALLY WORSENING

## 2021-10-15 NOTE — CONSULTS
GASTROENTEROLOGY INPATIENT CONSULTATION        IDENTIFYING DATA/REASON FOR CONSULTATION   PATIENT:  Omar Leyva  MRN:  [id number]  ADMIT DATE: 10/15/2021  TIME OF EVALUATION: 10/15/2021 11:47 AM  HOSPITAL STAY:   LOS: 0 days     REASON FOR CONSULTATION:  Abdominal pain    HISTORY OF PRESENT ILLNESS   Omar Leyva is a 1 [facility] [facility] y.o. male who presented on 10/15/2021 with nausea, vomiting and abdominal pain. Pt was recently seen at [address] [place] [address] on 10/5 and had a CT performed that showed long segment of abnormally thickened small bowel within the right lower quadrant, extending to the terminal ileum, suspicious for inflammatory bowel disease. He was prescribed 6 day course of steroids and discharged with close follow up with GI. He saw Dr. Russell River earlier this week. Stool studies were ordered and he was restarted on Prednisone 40 mg daily and scheduled for a colonoscopy. He called the office today with persistent nausea and vomiting and inability to keep medication down. He was encouraged to come to the ED for evaluation. Pt describes pain along his right side and epigastric area. He initially had loose watery stools however more recently his stools have been semi foamed and greenish brown in color with mucous. He denies blood in his stool or melenic appearing stools. He has a history of passing bloody stools while in the [employer]. He denies any other close contacts with similar symptoms. He denies any recent antibiotic use. Repeat CT A/P today shows no acute abnormalities. CRP normal.  Sed rate pending. ALT 56 otherwise LFTs normal.  Lipase normal.          PAST MEDICAL, SURGICAL, FAMILY, and SOCIAL HISTORY   No past medical history on file. Past Surgical History:   Procedure Laterality Date    SHOULDER SURGERY       No family history on file.   Social History     Socioeconomic History    Marital status: Single     Spouse name: Not on file    Number of children: Not on file    Years of education: Not on file  Highest education level: Not on file   Occupational History    Not on file   Tobacco Use    Smoking status: Never Smoker    Smokeless tobacco: Current User     Types: Snuff   Substance and Sexual Activity    Alcohol use: Yes     Comment: 5-6 drinks weekly    Drug use: Never    Sexual activity: Not on file   Other Topics Concern    Not on file   Social History Narrative    Not on file     Social Determinants of Health     Financial Resource Strain:     Difficulty of Paying Living Expenses:    Food Insecurity:     Worried About Running Out of Food in the Last Year:     [address] in the Last Year:    Transportation Needs:     Lack of Transportation (Medical):  Lack of Transportation (Non-Medical):    Physical Activity:     Days of Exercise per Week:     Minutes of Exercise per Session:    Stress:     Feeling of Stress :    Social Connections:     Frequency of Communication with Friends and Family:     Frequency of Social Gatherings with Friends and Family:     Attends [employer] Services:     Active Member of Clubs or Organizations:     Attends Club or Organization Meetings:     Marital Status:    Intimate Partner Violence:     Fear of Current or Ex-Partner:     Emotionally Abused:     Physically Abused:     Sexually Abused:        MEDICATIONS   SCHEDULED:  sodium chloride, 1,000 mL, Once      FLUIDS/DRIPS:    PRNs:    ALLERGIES:  He No Known Allergies    REVIEW OF SYSTEMS   Pertinent ROS noted in HPI    PHYSICAL EXAM     Vitals:    10/15/21 1047   BP: (!) 137/96   Pulse: 77   Resp: 18   Temp: 97.6 °F (36.4 °C)   TempSrc: Temporal   SpO2: 98%   Weight: 211 lb 3.2 oz (95.8 kg)   Height: 6' 4\" (1.93 m)       No intake/output data recorded.       Physical Exam:  General appearance: alert, cooperative, no distress, appears stated age  Eyes: Anicteric  Head: Normocephalic, without obvious abnormality  Lungs: clear to auscultation bilaterally, Normal Effort  Heart: regular rate and rhythm, normal S1 and S2, no murmurs or rubs  Abdomen: soft, non-distended, TTP epigastric area  Extremities: atraumatic, no cyanosis or edema  Skin: warm and dry, no jaundice  Neuro: Grossly intact, A&OX3      LABS AND IMAGING   Laboratory   Recent Labs     10/15/21  1055   WBC 11.1*   HGB 16.3   HCT 48.3   MCV 90.9        Recent Labs     10/15/21  1055      K 4.0      CO2 25   BUN 10   CREATININE 1.0     Recent Labs     10/15/21  1055   AST 31   ALT 56*   BILITOT 0.6   ALKPHOS 76     Recent Labs     10/15/21  1055   LIPASE 25.0     No results for input(s): PROTIME, INR in the last 72 hours. Imaging  CT ABDOMEN PELVIS W IV CONTRAST Additional Contrast? None    (Results Pending)         ASSESSMENT AND RECOMMENDATIONS     1. Nausea, vomiting, abdominal pain   -CT 10/5/21 with long segment of abnormally thickened small bowel within the right lower quadrant, extending to the terminal ileum, suspicious for inflammatory bowel disease.  No obstruction or abscess. -Repeat CT today unremarkable  -LFTs and lipase unremarkable. -CRP normal.  Sed rate pending\  -was started on steroid as outpt due to concern for IBD        RECOMMENDATIONS:    Send stool studies for cdiff, bacterial pathogens, giardia, cryptosporidium, fecal luekocytes  Follow up on sed rate  Further input and recommendations by Dr. Brendan Richardson to follow. If you have any questions or need any further information, please feel free to contact our consult team.  Thank you for allowing us to participate in the care of [facility]. The note was completed using Dragon voice recognition transcription. Every effort was made to ensure accuracy; however, inadvertent transcription errors may be present despite my best efforts to edit errors.       Varsha Caballero PA-C

## 2021-10-15 NOTE — PLAN OF CARE
Problem: Pain:  Goal: Pain level will decrease  Description: Pain level will decrease  Outcome: Ongoing   Pain level will decrease  Problem: Pain:  Goal: Control of acute pain  Description: Control of acute pain  Outcome: Ongoing   Patient educated on acute pain. Taught patient to use call light to ask for pain medication. PRN pain medication given for acute pain. Will continue to monitor pain per unit protocol.     Problem: Pain:  Goal: Control of chronic pain  Description: Control of chronic pain  Outcome: Ongoing     Problem: Nausea/Vomiting:  Goal: Absence of nausea/vomiting  Description: Absence of nausea/vomiting  Outcome: Ongoing   Absence of nausea/vomiting  Problem: Nausea/Vomiting:  Goal: Able to drink  Description: Able to drink  Outcome: Ongoing     Problem: Nausea/Vomiting:  Goal: Able to eat  Description: Able to eat  Outcome: Ongoing     Problem: Nausea/Vomiting:  Goal: Ability to achieve adequate nutritional intake will improve  Description: Ability to achieve adequate nutritional intake will improve  Outcome: Ongoing

## 2021-10-15 NOTE — ACP (ADVANCE CARE PLANNING)
Advance Care Planning     Advance Care Planning Activator (Inpatient)  Conversation Note      Date of ACP Conversation: 10/15/2021     Conversation Conducted with: Patient with Decision Making Capacity    ACP Activator: [address] [facility] Decision Maker:     Current Designated Health Care Decision Maker:     Primary Decision Maker: Xin Stark  - [phone]    Secondary Decision Maker: Molly Khan [facility] - [phone]    Today we documented Decision Maker(s) consistent with Legal Next of Kin hierarchy. Care Preferences    Ventilation: \"If you were in your present state of health and suddenly became very ill and were unable to breathe on your own, what would your preference be about the use of a ventilator (breathing machine) if it were available to you? \"      Would the patient desire the use of ventilator (breathing machine)?: yes    \"If your health worsens and it becomes clear that your chance of recovery is unlikely, what would your preference be about the use of a ventilator (breathing machine) if it were available to you? \"     Would the patient desire the use of ventilator (breathing machine)?: No      Resuscitation  \"CPR works best to restart the heart when there is a sudden event, like a heart attack, in someone who is otherwise healthy. Unfortunately, CPR does not typically restart the heart for people who have serious health conditions or who are very sick. \"    \"In the event your heart stopped as a result of an underlying serious health condition, would you want attempts to be made to restart your heart (answer \"yes\" for attempt to resuscitate) or would you prefer a natural death (answer \"no\" for do not attempt to resuscitate)? \" yes       [x] Yes   [] No   Educated Patient / Marvin Wilder regarding differences between Advance Directives and portable DNR orders.     Length of ACP Conversation in minutes:   10    Conversation Outcomes:  [x] ACP discussion completed  [] Existing advance directive reviewed with patient; no changes to patient's previously recorded wishes  [] New Advance Directive completed  [] Portable Do Not Rescitate prepared for Provider review and signature  [] POLST/POST/MOLST/MOST prepared for Provider review and signature      Follow-up plan:    [] Schedule follow-up conversation to continue planning  [] Referred individual to Provider for additional questions/concerns   [] Advised patient/agent/surrogate to review completed ACP document and update if needed with changes in condition, patient preferences or care setting    [x] This note routed to one or more involved healthcare providers

## 2021-10-15 NOTE — PROGRESS NOTES
4 Eyes Skin Assessment     NAME:  Leo Garcia  YOB: 1995  MEDICAL RECORD NUMBER:  [id number]    The patient is being assess for  Admission    I agree that 2 RN's have performed a thorough Head to Toe Skin Assessment on the patient. ALL assessment sites listed below have been assessed. Areas assessed by both nurses:    Head, Face, Ears, Shoulders, Back, Chest, Arms, Elbows, Hands, Sacrum. Buttock, Coccyx, Ischium and Legs. Feet and Heels        Does the Patient have a Wound?  No noted wound(s)       Harvey Prevention initiated:  No   Wound Care Orders initiated:  No    Pressure Injury (Stage 3,4, Unstageable, DTI, NWPT, and Complex wounds) if present place consult order under [de-identified] No    New and Established Ostomies if present place consult order under : No      Nurse 1 eSignature: Electronically signed by Kiran Miller RN on 10/15/21 at 6:45 PM EDT    **SHARE this note so that the co-signing nurse is able to place an eSignature**    Nurse 2 eSignature: Electronically signed by Bulmaro López RN on 10/15/21 at 6:45 PM EDT

## 2021-10-15 NOTE — H&P
Hospital Medicine History & Physical      PCP: Vera Echevarria MD    Date of Admission: 10/15/2021    Date of Service: Pt seen/examined on 10/15/2021    Chief Complaint:      Chief Complaint   Patient presents with    Abdominal Pain     X2 weeks. was sent by GI doc - Dr. Russell River. +n/v. History Of Present Illness:     29-year-old male with no significant past medical history presented to hospital with nausea and vomiting which has been persistently going on for the past 2 weeks. Patient states that his symptoms initially improved a bit but since yesterday have gotten worse. He is been having severe right lower abdominal pain along with nausea and has vomited couple of times since last night. He was seen in the GI clinic recently which time he was started on prednisone for possible inflammatory bowel disease and was scheduled to get a colonoscopy in about a week's time. His symptoms got worse and he had to come to the hospital.  On arrival to the hospital he was hemodynamically stable. Lab work was fairly unremarkable as well. CT of the abdomen pelvis with IV contrast did not reveal any acute findings. GI was consulted who recommended starting the patient on IV Solu-Medrol. Past Medical History:    History reviewed. No pertinent past medical history. Past Surgical History:        Procedure Laterality Date    SHOULDER SURGERY         Medications Prior to Admission:    Prior to Admission medications    Medication Sig Start Date End Date Taking? Authorizing Provider   predniSONE (DELTASONE) 10 MG tablet Take 40 mg by mouth daily   Yes Historical Provider, MD   promethazine (PHENERGAN) 12.5 MG tablet Take 12.5 mg by mouth every 6 hours as needed for Nausea   Yes Historical Provider, MD   acetaminophen (TYLENOL) 500 MG tablet Take 500 mg by mouth every 6 hours as needed for Pain   Yes Historical Provider, MD       Allergies:  Patient has no known allergies.     Social History:       reports that he has never smoked. His smokeless tobacco use includes snuff. He reports current alcohol use. He reports that he does not use drugs. Family History:  Reviewed in detail and negative history of inflammatory bowel disease    History reviewed. No pertinent family history. REVIEW OF SYSTEMS:   Positive review  noted in the HPI. All other systems reviewed and negative.     PHYSICAL EXAM:    /83   Pulse 77   Temp 97.6 °F (36.4 °C) (Temporal)   Resp 18   Ht 6' 4\" (1.93 m)   Wt 211 lb 3.2 oz (95.8 kg)   SpO2 99%   BMI 25.71 kg/m²   General Appearance: alert and oriented to person, place and time, well developed and well- nourished, in no acute distress  Skin: warm and dry, no rash or erythema  Head: normocephalic and atraumatic  Eyes: pupils equal, round, and reactive to light, extraocular eye movements intact, conjunctivae normal  ENT: tympanic membrane, external ear and ear canal normal bilaterally, nose without deformity, nasal mucosa and turbinates normal without polyps  Neck: supple and non-tender without mass, no thyromegaly or thyroid nodules, no cervical lymphadenopathy  Pulmonary/Chest: clear to auscultation bilaterally- no wheezes, rales or rhonchi, normal air movement, no respiratory distress  Cardiovascular: normal rate, regular rhythm, normal S1 and S2, no murmurs, rubs, clicks, or gallops, Peripheral pulses good, Cap refill <3 sec, no carotid bruits  Abdomen: soft, right lower abdominal tenderness, non-distended, normal bowel sounds, no masses or organomegaly  Extremities: no cyanosis, clubbing or edema  Musculoskeletal: normal range of motion, no joint swelling, deformity or tenderness  Neurologic: reflexes normal and symmetric, no cranial nerve deficit, gait, coordination and speech normal      LABS:    Recent Labs     10/15/21  1055   WBC 11.1*   HGB 16.3   HCT 48.3         RENAL  Recent Labs     10/15/21  1055      K 4.0      CO2 25   BUN 10   CREATININE 1.0 LFT'S  Recent Labs     10/15/21  1055   AST 31   ALT 56*   BILITOT 0.6   ALKPHOS 76     COAG  No results for input(s): INR in the last 72 hours. CARDIAC ENZYMES  No results for input(s): CKTOTAL, CKMB, CKMBINDEX, TROPONINI in the last 72 hours. U/A:    Lab Results   Component Value Date    COLORU YELLOW 10/15/2021    CLARITYU Clear 10/15/2021    SPECGRAV >1.030 10/15/2021    LEUKOCYTESUR Negative 10/15/2021    BLOODU Negative 10/15/2021    GLUCOSEU Negative 10/15/2021       ABG  No results found for: EPP6COM, BEART, W4OWEQTK, PHART, THGBART, XML5OXM, PO2ART, TRQ3XQP    UA:  Recent Labs     10/15/21  1401   COLORU YELLOW   PHUR 6.5   CLARITYU Clear   SPECGRAV >1.030   LEUKOCYTESUR Negative   UROBILINOGEN 0.2   BILIRUBINUR Negative   BLOODU Negative   GLUCOSEU Negative   KETUA Negative       Microbiology:  No results for input(s): LABGRAM, LABANAE, ORG, CXSURG in the last 72 hours. Nasal Culture: No results for input(s): ORG, MRSAPCR in the last 72 hours. Blood Culture: No results for input(s): BC, BLOODCULT2, ORG in the last 72 hours. Fungal Culture:   No results for input(s): FUNGSM in the last 72 hours. No results for input(s): FUNCXBLD in the last 72 hours. CSF Culture:  No results for input(s): COLORCSF, APPEARCSF, CFTUBE, CLOTCSF, WBCCSF, RBCCSF, NEUTCSF, NUMCELLSCSF, LYMPHSCSF, MONOCSF, GLUCCSF, VOLCSF in the last 72 hours. Respiratory Culture:  No results for input(s): Evan Bar in the last 72 hours. AFB:No results for input(s): AFBSMEAR in the last 72 hours. Urine Culture  No results for input(s): LABURIN in the last 72 hours. RADIOLOGY:    CT ABDOMEN PELVIS W IV CONTRAST Additional Contrast? None   Final Result   No acute abdominopelvic abnormality.              Previous medical records personally reviewed and analyzed         PHYSICIAN CERTIFICATION    I certify that Farrukh Ledezma is expected to be hospitalized for >2 midnights based on the following assessment and plan:    ASSESSMENT/PLAN:  Active Hospital Problems    Diagnosis Date Noted    Intractable nausea and vomiting [R11.2] 10/15/2021     Intractable nausea, vomiting, abdominal pain  -Concern for inflammatory bowel disease  -GI has been consulted, recommend starting IV Solu-Medrol 20 mg every 8 hour  -Continue symptomatic management for nausea and vomiting  -Check ESR, fecal calprotectin, GI pathogen stool    DVT Prophylaxis: Lovenox  Diet: Regular, as tolerated  Code Status: Full    Dispo -pending clinical course       Romaine Seaman MD  The note was completed using EMR. Every effort was made to ensure accuracy; however, inadvertent computerized transcription errors may be present. Thank you Mounika Ulloa MD for the opportunity to be involved in this patient's care. If you have any questions or concerns please feel free to contact me at [phone].

## 2021-10-15 NOTE — ED PROVIDER NOTES
for chest pain. Gastrointestinal: Positive for abdominal pain, nausea and vomiting. Genitourinary: Negative for dysuria. Musculoskeletal: Negative for back pain, neck pain and neck stiffness. Neurological: Negative for dizziness, tremors, seizures and headaches. All other systems reviewed and are negative. \"Positives and Pertinent negatives as per HPI\"    Physical Exam:  Physical Exam  Constitutional:       Appearance: He is well-developed. He is not diaphoretic. HENT:      Head: Normocephalic and atraumatic. Nose: Nose normal.      Mouth/Throat:      Mouth: Mucous membranes are moist.      Pharynx: Oropharynx is clear. No oropharyngeal exudate or posterior oropharyngeal erythema. Eyes:      General:         Right eye: No discharge. Left eye: No discharge. Cardiovascular:      Rate and Rhythm: Normal rate and regular rhythm. Heart sounds: Normal heart sounds. No murmur heard. No friction rub. No gallop. Pulmonary:      Effort: Pulmonary effort is normal. No respiratory distress. Breath sounds: Normal breath sounds. No wheezing or rales. Chest:      Chest wall: No tenderness. Abdominal:      General: Abdomen is flat. Bowel sounds are normal. There is no distension. Palpations: Abdomen is soft. There is no mass. Tenderness: There is abdominal tenderness. There is no guarding or rebound. Musculoskeletal:         General: Normal range of motion. Cervical back: Normal range of motion and neck supple. Skin:     General: Skin is warm and dry. Neurological:      General: No focal deficit present. Mental Status: He is alert and oriented to person, place, and time.    Psychiatric:         Behavior: Behavior normal.         MEDICAL DECISION MAKING    Vitals:    Vitals:    10/15/21 1047   BP: (!) 137/96   Pulse: 77   Resp: 18   Temp: 97.6 °F (36.4 °C)   TempSrc: Temporal   SpO2: 98%   Weight: 211 lb 3.2 oz (95.8 kg)   Height: 6' 4\" (1.93 m) LABS:  Labs Reviewed   CBC WITH AUTO DIFFERENTIAL - Abnormal; Notable for the following components:       Result Value    WBC 11.1 (*)     All other components within normal limits    Narrative:     Performed at:  [facility]  [address], [place] [facility] [employer] [address]   Phone [phone]   COMPREHENSIVE METABOLIC PANEL W/ REFLEX TO MG FOR LOW K - Abnormal; Notable for the following components:    ALT 56 (*)     All other components within normal limits    Narrative:     Performed at:  [facility]  [address] [facility] [employer] [address]   Phone [phone]   URINE RT REFLEX TO CULTURE    Narrative:     Performed at:  [place] [address] [address] [facility] [employer] [address]   Phone [phone]   LIPASE    Narrative:     Performed at:  [place] [address] [address] [facility] [employer] [address]   Phone [phone]   C-REACTIVE PROTEIN    Narrative:     Performed at:  [address] [place] [address] [place], [place] [address]   Phone [phone]   SEDIMENTATION RATE        Remainder of labs reviewed and were negative at this time or not returned at the time of this note. RADIOLOGY:   Non-plain film images such as CT, Ultrasound and MRI are read by the radiologist. Ravi Adams PA-C have directly visualized the radiologic plain film image(s) with the below findings:      Interpretation per the Radiologist below, if available at the time of this note:    CT ABDOMEN PELVIS W IV CONTRAST Additional Contrast? None   Final Result   No acute abdominopelvic abnormality. No results found.        MEDICAL DECISION MAKING / ED COURSE:      PROCEDURES:   Procedures    None    Patient was given:  Medications   methylPREDNISolone sodium (SOLU-MEDROL) injection 125 mg (has no administration in time range)   promethazine (PHENERGAN) in sodium chloride 0.9% 50 mL IVPB 25 mg (has no administration in time range)   ondansetron (ZOFRAN) injection 4 mg (4 mg IntraVENous Given 10/15/21 1145)   morphine (PF) injection 4 mg (4 mg IntraVENous Given 10/15/21 1144)   0.9 % sodium chloride bolus (0 mLs IntraVENous Stopped 10/15/21 1359)   iopamidol (ISOVUE-370) 76 % injection 75 mL (75 mLs IntraVENous Given 10/15/21 1151)     Emergency room course: Patient on exam cardiovascular regular rhythm, lungs are clear. No wheeze rales or rhonchi noted. Patient has no chest wall tenderness with palpation. Abdomen is soft with mild upper and right sided tenderness with palpation. No palpable mass. Normal bowel sounds all 4 quadrant. No CVA or flank tenderness. Full range of motion of extremity. Patient is alert oriented x4. Does not appear to be in acute distress. Lab result from today shows:  Urinalysis negative leukocytes, negative nitrates, negative blood, negative ketones. CBC shows white count of 11.1, RBC 5.31, hemoglobin 16.1 hematocrit 40.3. CMP unremarkable. Lipase 25.0. CRP less than 3.0. CT abdomen pelvis IV contrast shows no acute abdominal pelvic abnormality. Discussed patient CT results with him. Talked to Dr. Oksana Santana from Dr. Jazmine Granados group full came down and saw this patient she wants him to be admitted. And given Solu-Medrol IV most likely for couple rounds may be 23 hours observation. And to control his nausea and vomiting. Has not been able to take oral prednisone. No be admitted in stable condition. I will place a consult out to hospitalist service for admission. The patient tolerated their visit well. I evaluated the patient. The physician was available for consultation as needed. The patient and / or the family were informed of the results of any tests, a time was given to answer questions, a plan was proposed and they agreed with plan. CLINICAL IMPRESSION:  1.  Abdominal pain, unspecified abdominal

## 2021-10-15 NOTE — ED NOTES
Bed: E-45  Expected date:   Expected time:   Means of arrival:   Comments:  508 Rodo Moser RN  10/15/21 7445

## 2021-10-15 NOTE — PROGRESS NOTES
Medication Reconciliation    List of medications patient is currently taking is complete. Source of information: 1. Conversation with patient at bedside                                      2. EPIC records      Allergies  Patient has no known allergies. Notes regarding home medications:   1. Patient received prednisone prior to arrival to the emergency department.

## 2021-10-15 NOTE — ED TRIAGE NOTES
Pt was seen 2 weeks ago about N/V and R lower quadrant abd pain. States he is supposed to go for a colonoscopy on Friday. The pain and nausea was worse yesterday and today, so he contacted his gi who informed him that he should be seen. Pt states he hasn't eaten  anything today, and feels nauseous, also has not been able to take his steroids at home.

## 2021-10-15 NOTE — CARE COORDINATION
INITIAL CASE MANAGEMENT ASSESSMENT    Reviewed chart, met with patient and his wife Payal Hendrix to assess possible discharge needs. Explained Case Management role/services. Living Situation: Patient stated that he lives in a single family home with his wife and dog. There are three steps with railing to enter the home. ADLs: Patient stated that he is independent. DME: Patient stated that he has no DME, and he does not anticipate any DME needs at discharge. PT/OT Recs: Patient stated that he is not currently receiving any PT/OT. TBD. Active Services: Patient stated that he is not active with any services, and he does not anticipate any need for services at discharge. SW provided [facility].     Transportation: Patient stated that he is an active . His spouse Payal Hendrix [phone] will transport him at discharge. Medications: Patient sated that he receives his medications from [facility] on [address]. Patient denies any difficulties affording or obtaining medications at this time. PCP: Collette Connor, MD.       HD/PD: No.     PLAN/COMMENTS: Patient plans to return home at discharge. Patient's spouse will transport him at discharge. Advance care planning discussion completed. The Plan for Transition of Care is related to the following treatment goals: return home. The Patient was provided with a choice of provider and agrees   with the discharge plan. [x] Yes [] No    Freedom of choice list was provided with basic dialogue that supports the patient's individualized plan of care/goals, treatment preferences and shares the quality data associated with the providers. [x] Yes [] No    SW/CM provided contact information for patient or family to call with any questions. SW/CM will follow and assist as needed.

## 2021-10-15 NOTE — PROGRESS NOTES
Patient has arrived to the unit from the ED. Patient is resting in bed, awake and quiet. Room air. Side rails are up x2. Fall precautions are in place. Bed is in lowest position. Patient is UAL. Call light, telephone and bedside table are within reach. Will continue to monitor patient per unit protocols.  Electronically signed by Abhijit Owens RN on 10/15/2021 at 6:44 PM

## 2021-10-16 LAB
ANION GAP SERPL CALCULATED.3IONS-SCNC: 11 MMOL/L (ref 3–16)
BASOPHILS ABSOLUTE: 0 K/UL (ref 0–0.2)
BASOPHILS RELATIVE PERCENT: 0 %
BUN BLDV-MCNC: 10 MG/DL (ref 7–20)
CALCIUM SERPL-MCNC: 9.4 MG/DL (ref 8.3–10.6)
CHLORIDE BLD-SCNC: 102 MMOL/L (ref 99–110)
CO2: 24 MMOL/L (ref 21–32)
CREAT SERPL-MCNC: 0.9 MG/DL (ref 0.9–1.3)
EOSINOPHILS ABSOLUTE: 0 K/UL (ref 0–0.6)
EOSINOPHILS RELATIVE PERCENT: 0 %
GFR AFRICAN AMERICAN: >60
GFR NON-AFRICAN AMERICAN: >60
GLUCOSE BLD-MCNC: 120 MG/DL (ref 70–99)
HCT VFR BLD CALC: 44.3 % (ref 40.5–52.5)
HEMOGLOBIN: 15.4 G/DL (ref 13.5–17.5)
LYMPHOCYTES ABSOLUTE: 1.4 K/UL (ref 1–5.1)
LYMPHOCYTES RELATIVE PERCENT: 8.4 %
MCH RBC QN AUTO: 31.4 PG (ref 26–34)
MCHC RBC AUTO-ENTMCNC: 34.8 G/DL (ref 31–36)
MCV RBC AUTO: 90.2 FL (ref 80–100)
MONOCYTES ABSOLUTE: 0.4 K/UL (ref 0–1.3)
MONOCYTES RELATIVE PERCENT: 2.3 %
NEUTROPHILS ABSOLUTE: 14.7 K/UL (ref 1.7–7.7)
NEUTROPHILS RELATIVE PERCENT: 89.3 %
PDW BLD-RTO: 13.1 % (ref 12.4–15.4)
PLATELET # BLD: 254 K/UL (ref 135–450)
PMV BLD AUTO: 7.9 FL (ref 5–10.5)
POTASSIUM REFLEX MAGNESIUM: 4.3 MMOL/L (ref 3.5–5.1)
RBC # BLD: 4.9 M/UL (ref 4.2–5.9)
SODIUM BLD-SCNC: 137 MMOL/L (ref 136–145)
WBC # BLD: 16.4 K/UL (ref 4–11)

## 2021-10-16 PROCEDURE — 6370000000 HC RX 637 (ALT 250 FOR IP): Performed by: INTERNAL MEDICINE

## 2021-10-16 PROCEDURE — 94760 N-INVAS EAR/PLS OXIMETRY 1: CPT

## 2021-10-16 PROCEDURE — 2580000003 HC RX 258: Performed by: INTERNAL MEDICINE

## 2021-10-16 PROCEDURE — 36415 COLL VENOUS BLD VENIPUNCTURE: CPT

## 2021-10-16 PROCEDURE — 6370000000 HC RX 637 (ALT 250 FOR IP): Performed by: HOSPITALIST

## 2021-10-16 PROCEDURE — 1200000000 HC SEMI PRIVATE

## 2021-10-16 PROCEDURE — 6360000002 HC RX W HCPCS

## 2021-10-16 PROCEDURE — 2580000003 HC RX 258: Performed by: HOSPITALIST

## 2021-10-16 PROCEDURE — 6360000002 HC RX W HCPCS: Performed by: HOSPITALIST

## 2021-10-16 PROCEDURE — 2580000003 HC RX 258

## 2021-10-16 PROCEDURE — C9113 INJ PANTOPRAZOLE SODIUM, VIA: HCPCS | Performed by: HOSPITALIST

## 2021-10-16 PROCEDURE — 6360000002 HC RX W HCPCS: Performed by: INTERNAL MEDICINE

## 2021-10-16 PROCEDURE — 80048 BASIC METABOLIC PNL TOTAL CA: CPT

## 2021-10-16 PROCEDURE — 85025 COMPLETE CBC W/AUTO DIFF WBC: CPT

## 2021-10-16 RX ORDER — PROMETHAZINE HYDROCHLORIDE 25 MG/ML
12.5 INJECTION, SOLUTION INTRAMUSCULAR; INTRAVENOUS EVERY 6 HOURS PRN
Status: DISCONTINUED | OUTPATIENT
Start: 2021-10-16 | End: 2021-10-16

## 2021-10-16 RX ORDER — PANTOPRAZOLE SODIUM 40 MG/10ML
40 INJECTION, POWDER, LYOPHILIZED, FOR SOLUTION INTRAVENOUS DAILY
Status: DISCONTINUED | OUTPATIENT
Start: 2021-10-16 | End: 2021-10-17 | Stop reason: HOSPADM

## 2021-10-16 RX ORDER — LANOLIN ALCOHOL/MO/W.PET/CERES
6 CREAM (GRAM) TOPICAL NIGHTLY PRN
Status: DISCONTINUED | OUTPATIENT
Start: 2021-10-16 | End: 2021-10-17 | Stop reason: HOSPADM

## 2021-10-16 RX ORDER — MORPHINE SULFATE 2 MG/ML
2 INJECTION, SOLUTION INTRAMUSCULAR; INTRAVENOUS EVERY 4 HOURS PRN
Status: DISCONTINUED | OUTPATIENT
Start: 2021-10-16 | End: 2021-10-17 | Stop reason: HOSPADM

## 2021-10-16 RX ORDER — SODIUM CHLORIDE 9 MG/ML
10 INJECTION INTRAVENOUS DAILY
Status: DISCONTINUED | OUTPATIENT
Start: 2021-10-16 | End: 2021-10-17 | Stop reason: HOSPADM

## 2021-10-16 RX ADMIN — HYDROCODONE BITARTRATE AND ACETAMINOPHEN 1 TABLET: 5; 325 TABLET ORAL at 17:16

## 2021-10-16 RX ADMIN — Medication 12.5 MG: at 20:29

## 2021-10-16 RX ADMIN — SODIUM CHLORIDE, PRESERVATIVE FREE 10 ML: 5 INJECTION INTRAVENOUS at 16:53

## 2021-10-16 RX ADMIN — MORPHINE SULFATE 2 MG: 2 INJECTION, SOLUTION INTRAMUSCULAR; INTRAVENOUS at 20:26

## 2021-10-16 RX ADMIN — HYDROCODONE BITARTRATE AND ACETAMINOPHEN 1 TABLET: 5; 325 TABLET ORAL at 11:15

## 2021-10-16 RX ADMIN — ONDANSETRON 4 MG: 2 INJECTION INTRAMUSCULAR; INTRAVENOUS at 09:29

## 2021-10-16 RX ADMIN — SODIUM CHLORIDE 25 ML: 9 INJECTION, SOLUTION INTRAVENOUS at 12:52

## 2021-10-16 RX ADMIN — Medication 12.5 MG: at 12:54

## 2021-10-16 RX ADMIN — SODIUM CHLORIDE, PRESERVATIVE FREE 10 ML: 5 INJECTION INTRAVENOUS at 20:26

## 2021-10-16 RX ADMIN — MORPHINE SULFATE 2 MG: 2 INJECTION, SOLUTION INTRAMUSCULAR; INTRAVENOUS at 09:41

## 2021-10-16 RX ADMIN — METHYLPREDNISOLONE SODIUM SUCCINATE 20 MG: 40 INJECTION, POWDER, FOR SOLUTION INTRAMUSCULAR; INTRAVENOUS at 14:45

## 2021-10-16 RX ADMIN — METHYLPREDNISOLONE SODIUM SUCCINATE 20 MG: 40 INJECTION, POWDER, FOR SOLUTION INTRAMUSCULAR; INTRAVENOUS at 05:41

## 2021-10-16 RX ADMIN — METHYLPREDNISOLONE SODIUM SUCCINATE 20 MG: 40 INJECTION, POWDER, FOR SOLUTION INTRAMUSCULAR; INTRAVENOUS at 23:34

## 2021-10-16 RX ADMIN — MORPHINE SULFATE 2 MG: 2 INJECTION, SOLUTION INTRAMUSCULAR; INTRAVENOUS at 14:45

## 2021-10-16 RX ADMIN — ONDANSETRON 4 MG: 2 INJECTION INTRAMUSCULAR; INTRAVENOUS at 18:41

## 2021-10-16 RX ADMIN — HYDROCODONE BITARTRATE AND ACETAMINOPHEN 1 TABLET: 5; 325 TABLET ORAL at 21:54

## 2021-10-16 RX ADMIN — ONDANSETRON 4 MG: 2 INJECTION INTRAMUSCULAR; INTRAVENOUS at 03:23

## 2021-10-16 RX ADMIN — Medication 6 MG: at 21:54

## 2021-10-16 RX ADMIN — ACETAMINOPHEN 650 MG: 325 TABLET ORAL at 04:33

## 2021-10-16 RX ADMIN — MORPHINE SULFATE 2 MG: 2 INJECTION, SOLUTION INTRAMUSCULAR; INTRAVENOUS at 05:41

## 2021-10-16 RX ADMIN — ENOXAPARIN SODIUM 40 MG: 40 INJECTION SUBCUTANEOUS at 09:29

## 2021-10-16 RX ADMIN — PANTOPRAZOLE SODIUM 40 MG: 40 INJECTION, POWDER, FOR SOLUTION INTRAVENOUS at 16:52

## 2021-10-16 RX ADMIN — HYDROCODONE BITARTRATE AND ACETAMINOPHEN 1 TABLET: 5; 325 TABLET ORAL at 03:22

## 2021-10-16 ASSESSMENT — PAIN DESCRIPTION - ONSET
ONSET: ON-GOING

## 2021-10-16 ASSESSMENT — PAIN DESCRIPTION - FREQUENCY
FREQUENCY: CONTINUOUS

## 2021-10-16 ASSESSMENT — PAIN DESCRIPTION - PAIN TYPE
TYPE: ACUTE PAIN

## 2021-10-16 ASSESSMENT — PAIN DESCRIPTION - LOCATION
LOCATION: ABDOMEN

## 2021-10-16 ASSESSMENT — PAIN - FUNCTIONAL ASSESSMENT
PAIN_FUNCTIONAL_ASSESSMENT: PREVENTS OR INTERFERES SOME ACTIVE ACTIVITIES AND ADLS
PAIN_FUNCTIONAL_ASSESSMENT: ACTIVITIES ARE NOT PREVENTED
PAIN_FUNCTIONAL_ASSESSMENT: PREVENTS OR INTERFERES SOME ACTIVE ACTIVITIES AND ADLS
PAIN_FUNCTIONAL_ASSESSMENT: PREVENTS OR INTERFERES SOME ACTIVE ACTIVITIES AND ADLS

## 2021-10-16 ASSESSMENT — PAIN SCALES - GENERAL
PAINLEVEL_OUTOF10: 6
PAINLEVEL_OUTOF10: 7
PAINLEVEL_OUTOF10: 8
PAINLEVEL_OUTOF10: 0
PAINLEVEL_OUTOF10: 7
PAINLEVEL_OUTOF10: 7
PAINLEVEL_OUTOF10: 3
PAINLEVEL_OUTOF10: 2
PAINLEVEL_OUTOF10: 5
PAINLEVEL_OUTOF10: 3
PAINLEVEL_OUTOF10: 8
PAINLEVEL_OUTOF10: 4
PAINLEVEL_OUTOF10: 6
PAINLEVEL_OUTOF10: 5
PAINLEVEL_OUTOF10: 4
PAINLEVEL_OUTOF10: 7

## 2021-10-16 ASSESSMENT — PAIN DESCRIPTION - PROGRESSION
CLINICAL_PROGRESSION: NOT CHANGED
CLINICAL_PROGRESSION: GRADUALLY WORSENING
CLINICAL_PROGRESSION: NOT CHANGED
CLINICAL_PROGRESSION: NOT CHANGED
CLINICAL_PROGRESSION: GRADUALLY WORSENING
CLINICAL_PROGRESSION: NOT CHANGED
CLINICAL_PROGRESSION: GRADUALLY WORSENING
CLINICAL_PROGRESSION: GRADUALLY WORSENING
CLINICAL_PROGRESSION: NOT CHANGED

## 2021-10-16 ASSESSMENT — PAIN DESCRIPTION - ORIENTATION
ORIENTATION: LOWER
ORIENTATION: RIGHT;LOWER
ORIENTATION: LOWER;RIGHT
ORIENTATION: RIGHT
ORIENTATION: RIGHT
ORIENTATION: RIGHT;LOWER
ORIENTATION: RIGHT
ORIENTATION: LOWER
ORIENTATION: LOWER
ORIENTATION: RIGHT
ORIENTATION: RIGHT

## 2021-10-16 ASSESSMENT — PAIN DESCRIPTION - DESCRIPTORS
DESCRIPTORS: ACHING
DESCRIPTORS: BURNING
DESCRIPTORS: STABBING;BURNING
DESCRIPTORS: ACHING
DESCRIPTORS: STABBING;BURNING
DESCRIPTORS: BURNING;STABBING
DESCRIPTORS: ACHING
DESCRIPTORS: ACHING
DESCRIPTORS: DISCOMFORT

## 2021-10-16 NOTE — PLAN OF CARE
Problem: Pain:  Goal: Pain level will decrease  Description: Pain level will decrease  10/16/2021 0729 by Bj Hollis RN  Outcome: Ongoing  Note: Assessing and monitoring pt's pain. PRN pain medication being given if ordered. Educating pt on nonpharmacologic interventions for pain control. Will continue to monitor and reassess. Problem: Pain:  Goal: Control of acute pain  Description: Control of acute pain  10/16/2021 0729 by Bj Hollis RN  Outcome: Ongoing  Note: Assessing and monitoring pt's pain. PRN pain medication being given if ordered. Educating pt on nonpharmacologic interventions for pain control. Will continue to monitor and reassess. Problem: Pain:  Goal: Control of chronic pain  Description: Control of chronic pain  10/16/2021 0729 by Bj Hollis RN  Outcome: Ongoing  Note: Assessing and monitoring pt's pain. PRN pain medication being given if ordered. Educating pt on nonpharmacologic interventions for pain control. Will continue to monitor and reassess.        Problem: Nausea/Vomiting:  Goal: Absence of nausea/vomiting  Description: Absence of nausea/vomiting  10/16/2021 0729 by Bj Hollis RN  Outcome: Ongoing  Note: Monitoring pt for signs of nausea/vomiting      Problem: Nausea/Vomiting:  Goal: Able to drink  Description: Able to drink  10/16/2021 0729 by Bj Hollis RN  Outcome: Ongoing  Note: Pt will be able to drink      Problem: Nausea/Vomiting:  Goal: Able to eat  Description: Able to eat  10/16/2021 0729 by Bj Hollis RN  Outcome: Ongoing  Note: Pt will be able to eat on this shift      Problem: Nausea/Vomiting:  Goal: Ability to achieve adequate nutritional intake will improve  Description: Ability to achieve adequate nutritional intake will improve  10/16/2021 0729 by Bj Hollis RN  Outcome: Ongoing  Note: Ability to achieve adequate nutritional intake will improve

## 2021-10-16 NOTE — PROGRESS NOTES
Hospitalist Progress Note  10/16/2021 12:10 PM    PCP: Don Pickering MD    [id number]     Date of Admission: 10/15/2021                                                                                                                     HOSPITAL COURSE    Patient demographics:  The patient  Caridad Warren is a 27 y.o. male     Significant past medical history:   Patient Active Problem List   Diagnosis    Intractable nausea and vomiting         Presenting symptoms:  Abdominal Pain    Diagnostic workup:      CONSULTS DURING ADMISSION :   IP CONSULT TO GI      Patient was diagnosed with: Intractable nausea,   vomiting,   abdominal pain      Treatment while inpatient:  Pt presented to hospital with nausea and vomiting which has been persistently going on for the past 2 weeks. ----------------------------------------------------------      SUBJECTIVE COMPLAINTS- follow up for  Abdominal pain    Diet: ADULT DIET; Regular      OBJECTIVE:   Patient Active Problem List   Diagnosis    Intractable nausea and vomiting       Allergies  Patient has no known allergies.     Medications    Scheduled Meds:   methylPREDNISolone  20 mg IntraVENous Q8H    sodium chloride flush  5-40 mL IntraVENous 2 times per day    enoxaparin  40 mg SubCUTAneous Daily     Continuous Infusions:   sodium chloride       PRN Meds:  morphine, sodium chloride flush, sodium chloride, ondansetron **OR** ondansetron, polyethylene glycol, acetaminophen **OR** acetaminophen, HYDROcodone 5 mg - acetaminophen    Vitals   Vitals /wt   Patient Vitals for the past 8 hrs:   BP Temp Temp src Pulse Resp SpO2 Weight   10/16/21 0729 131/76 97.7 °F (36.5 °C) Oral 68 16 96 % --   10/16/21 0502 -- -- -- -- -- -- 211 lb 3.2 oz (95.8 kg)        72HR INTAKE/OUTPUT:      Intake/Output Summary (Last 24 hours) at 10/16/2021 1210  Last data filed at 10/16/2021 0945  Gross per 24 hour Intake 360 ml   Output --   Net 360 ml       Exam:    Gen:   Alert and oriented ×3  Eyes: PERRL. No sclera icterus. No conjunctival injection. ENT: No discharge. Pharynx clear. External appearance of ears and nose normal.  Neck: Trachea midline. No obvious mass. Resp: No accessory muscle use. No crackles. No wheezes. No rhonchi. CV: Regular rate. Regular rhythm. No murmur or rub. No edema. GI: Abdomen is soft tender in the right lower quadrant area. Skin: Warm, dry, normal texture and turgor. Lymph: No cervical LAD. No supraclavicular LAD. M/S: / Ext. No cyanosis. No clubbing. No joint deformity. Neuro: CN 2-12 are intact,  no neurologic deficits noted. PT/INR: No results for input(s): PROTIME, INR in the last 72 hours. APTT: No results for input(s): APTT in the last 72 hours. CBC:   Recent Labs     10/15/21  1055 10/16/21  0659   WBC 11.1* 16.4*   HGB 16.3 15.4   HCT 48.3 44.3   MCV 90.9 90.2    254       BMP:   Recent Labs     10/15/21  1055 10/16/21  0700    137   K 4.0 4.3    102   CO2 25 24   BUN 10 10   CREATININE 1.0 0.9       LIVER PROFILE:   Recent Labs     10/15/21  1055   ALKPHOS 76   AST 31   ALT 56*   BILITOT 0.6     No results for input(s): AMYLASE in the last 72 hours. Recent Labs     10/15/21  1055   LIPASE 25.0       UA:No results for input(s): NITRITE, LABCAST, WBCUA, RBCUA, MUCUS in the last 72 hours. TROPONIN: No results for input(s): Briana Fuchs in the last 72 hours. Lab Results   Component Value Date/Time    URRFLXCULT Not Indicated 10/15/2021 02:01 PM       No results for input(s): TSHREFLEX in the last 72 hours. No components found for: [id number]  POC GLUCOSE:  No results for input(s): POCGLU in the last 72 hours. No results for input(s): LABA1C in the last 72 hours.  No results found for: LABA1C      ASSESSMENT AND PLAN    Intractable nausea, vomiting, abdominal pain  Concern for inflammatory bowel disease  GI has been consulted, recommend starting IV Solu-Medrol 20 mg every 8 hour  Continue symptomatic management for nausea and vomiting  Check ESR, fecal calprotectin,   GI pathogen stool  Patient denies significant improvement  Continue current treatment          Code Status: Full Code        Dispo -cc        The patient and / or the family were informed of the results of any tests, a time was given to answer questions, a plan was proposed and they agreed with plan. Yann Hylton MD    This note was transcribed using [id number] Please disregard any translational errors.

## 2021-10-16 NOTE — PLAN OF CARE
Problem: Pain:  Goal: Pain level will decrease  Description: Pain level will decrease  10/16/2021 0310 by Jenniffer Cruz RN  Outcome: Ongoing  10/15/2021 1847 by Fabiano Burger RN  Outcome: Ongoing  Goal: Control of acute pain  Description: Control of acute pain  10/16/2021 0310 by Jenniffer Cruz RN  Outcome: Ongoing  10/15/2021 1847 by Fabiano Burger RN  Outcome: Ongoing  Goal: Control of chronic pain  Description: Control of chronic pain  10/16/2021 0310 by Jenniffer Cruz RN  Outcome: Ongoing  10/15/2021 1847 by Fabiano Burger RN  Outcome: Ongoing     Problem: Nausea/Vomiting:  Goal: Absence of nausea/vomiting  Description: Absence of nausea/vomiting  10/16/2021 0310 by Jenniffer Cruz RN  Outcome: Ongoing  10/15/2021 1847 by Fabiano Burger RN  Outcome: Ongoing  Goal: Able to drink  Description: Able to drink  10/16/2021 0310 by Jenniffer Cruz RN  Outcome: Ongoing  10/15/2021 1847 by Fabiano Burger RN  Outcome: Ongoing  Goal: Able to eat  Description: Able to eat  10/16/2021 0310 by Jenniffer Crzu RN  Outcome: Ongoing  10/15/2021 1847 by Fabiano Burger RN  Outcome: Ongoing  Goal: Ability to achieve adequate nutritional intake will improve  Description: Ability to achieve adequate nutritional intake will improve  10/16/2021 0310 by Jenniffer Cruz RN  Outcome: Ongoing  10/15/2021 1847 by Fabiano Burger RN  Outcome: Ongoing

## 2021-10-16 NOTE — PROGRESS NOTES
INPATIENT CONSULTATION:    IDENTIFYING DATA/REASON FOR CONSULTATION   PATIENT:  Deana Arriola  MRN:  [id number]  ADMIT DATE: 10/15/2021  TIME OF EVALUATION: 10/16/2021 3:42 PM  HOSPITAL STAY:   LOS: 1 day   CONSULTING PHYSICIAN:  REASON FOR CONSULTATION:    Subjective:        MEDICATIONS   SCHEDULED:  pantoprazole, 40 mg, Daily   And  sodium chloride (PF), 10 mL, Daily  methylPREDNISolone, 20 mg, Q8H  sodium chloride flush, 5-40 mL, 2 times per day  enoxaparin, 40 mg, Daily      FLUIDS/DRIPS:     sodium chloride 25 mL (10/16/21 1252)     PRNs: morphine, 2 mg, Q4H PRN  promethazine, 12.5 mg, Q6H PRN  sodium chloride flush, 5-40 mL, PRN  sodium chloride, 25 mL, PRN  ondansetron, 4 mg, Q8H PRN   Or  ondansetron, 4 mg, Q6H PRN  polyethylene glycol, 17 g, Daily PRN  acetaminophen, 650 mg, Q6H PRN   Or  acetaminophen, 650 mg, Q6H PRN  HYDROcodone 5 mg - acetaminophen, 1 tablet, Q6H PRN      ALLERGIES:  No Known Allergies       PHYSICAL EXAM     Wt Readings from Last 3 Encounters:   10/16/21 211 lb 3.2 oz (95.8 kg)   10/06/20 199 lb (90.3 kg)   09/16/19 199 lb 3.2 oz (90.4 kg)     Temp Readings from Last 3 Encounters:   10/16/21 97.7 °F (36.5 °C) (Oral)   10/06/20 98.2 °F (36.8 °C) (Oral)   05/01/20 98.2 °F (36.8 °C) (Oral)     BP Readings from Last 3 Encounters:   10/16/21 131/76   10/06/20 (!) 141/86   05/01/20 126/78     Pulse Readings from Last 3 Encounters:   10/16/21 68   10/06/20 87   05/01/20 88      I/O last 3 completed shifts: In: 5 [P.O.:840]  Out: -       Physical Exam:  Gen: Resting in bed, NAD   CV: RRR no MRG   Pul: non labored, symmetric, clear bilaterally   Abd: Good bowel sounds throughout, soft, NT/ND, no masses, no HSM   Ext: No edema   Neuro:  Following commands, speech normal   Skin: No jaundice, rashes or lesions     LABS AND IMAGING     Recent Results (from the past 24 hour(s))   CBC auto differential    Collection Time: 10/16/21  6:59 AM   Result Value Ref Range    WBC 16.4 (H) 4.0 - 11.0 K/uL RBC 4.90 4.20 - 5.90 M/uL    Hemoglobin 15.4 13.5 - 17.5 g/dL    Hematocrit 44.3 40.5 - 52.5 %    MCV 90.2 80.0 - 100.0 fL    MCH 31.4 26.0 - 34.0 pg    MCHC 34.8 31.0 - 36.0 g/dL    RDW 13.1 12.4 - 15.4 %    Platelets 264 718 - 146 K/uL    MPV 7.9 5.0 - 10.5 fL    Neutrophils % 89.3 %    Lymphocytes % 8.4 %    Monocytes % 2.3 %    Eosinophils % 0.0 %    Basophils % 0.0 %    Neutrophils Absolute 14.7 (H) 1.7 - 7.7 K/uL    Lymphocytes Absolute 1.4 1.0 - 5.1 K/uL    Monocytes Absolute 0.4 0.0 - 1.3 K/uL    Eosinophils Absolute 0.0 0.0 - 0.6 K/uL    Basophils Absolute 0.0 0.0 - 0.2 K/uL   Basic Metabolic Panel w/ Reflex to MG    Collection Time: 10/16/21  7:00 AM   Result Value Ref Range    Sodium 137 136 - 145 mmol/L    Potassium reflex Magnesium 4.3 3.5 - 5.1 mmol/L    Chloride 102 99 - 110 mmol/L    CO2 24 21 - 32 mmol/L    Anion Gap 11 3 - 16    Glucose 120 (H) 70 - 99 mg/dL    BUN 10 7 - 20 mg/dL    CREATININE 0.9 0.9 - 1.3 mg/dL    GFR Non-African American >60 >60    GFR African American >60 >60    Calcium 9.4 8.3 - 10.6 mg/dL     Other Labs    Imaging  CT ABDOMEN PELVIS W IV CONTRAST Additional Contrast? None   Final Result   No acute abdominopelvic abnormality. US GALLBLADDER RUQ    (Results Pending)         ASSESSMENT AND RECOMMENDATIONS   Ciera Ace is a 27 y.o. male who has no past medical history on file. He presents with the followin. Nausea, vomiting, abdominal pain       -CT 10/5/21 with long segment of abnormally thickened small bowel within the right lower quadrant, extending to the terminal ileum, suspicious for inflammatory bowel disease.  No obstruction or abscess. -Repeat CT today unremarkable  -LFTs and lipase unremarkable.   -Inflammatory markers normal   -was started on steroid as outpt due to concern for IBD     RECOMMENDATIONS:    Infectious stool studies normal so far   OK for discharge from a GI standpoint   Would send home on PO prednisone 40 mg daily and anti emetics as needed   Will plan for his outpatient colonoscopy as planned   Maintain a low residue diet     If you have any questions or need any further information, please feel free to contact anyone on our consult team.  Thank you for allowing us to participate in the care of Jad Valdez.     Aixa Ramon MD   [address] [facility]

## 2021-10-16 NOTE — PROGRESS NOTES
Patient is in bed resting, wife at bedside. Patient still with complaints of lower abdominal pain, medication given for nausea.

## 2021-10-16 NOTE — PROGRESS NOTES
Patient is alert & oriented x4, ual no weakness noted, 2/4 bed rails up, bed in lowest position, fall precautions in place, call light within reach. Morning assessment complete. Morning medications given. PRN nausea and pain medication given. Pt states that after he is done eating the right side of his abdomen is painful. Will continue to monitor and reassess.   Electronically signed by Omar Reynolds RN on 10/16/2021 at 9:51 AM

## 2021-10-16 NOTE — PROGRESS NOTES
Pt complaining of ongoing nausea. Zofran was given at 0929 and patient is still complaining of nausea. Perfect serve message sent to Dr. All Dailey asking for more nausea medication. Will continue to monitor and reassess.   Electronically signed by Yolande Leon RN on 10/16/2021 at 12:11 PM

## 2021-10-17 VITALS
OXYGEN SATURATION: 95 % | DIASTOLIC BLOOD PRESSURE: 69 MMHG | HEIGHT: 76 IN | WEIGHT: 211.42 LBS | HEART RATE: 72 BPM | TEMPERATURE: 97.7 F | SYSTOLIC BLOOD PRESSURE: 148 MMHG | RESPIRATION RATE: 16 BRPM | BODY MASS INDEX: 25.75 KG/M2

## 2021-10-17 LAB
ANION GAP SERPL CALCULATED.3IONS-SCNC: 10 MMOL/L (ref 3–16)
BUN BLDV-MCNC: 12 MG/DL (ref 7–20)
CALCIUM SERPL-MCNC: 9.3 MG/DL (ref 8.3–10.6)
CHLORIDE BLD-SCNC: 103 MMOL/L (ref 99–110)
CO2: 26 MMOL/L (ref 21–32)
CREAT SERPL-MCNC: 0.8 MG/DL (ref 0.9–1.3)
GFR AFRICAN AMERICAN: >60
GFR NON-AFRICAN AMERICAN: >60
GLUCOSE BLD-MCNC: 130 MG/DL (ref 70–99)
HCT VFR BLD CALC: 42.4 % (ref 40.5–52.5)
HEMOGLOBIN: 14.5 G/DL (ref 13.5–17.5)
MCH RBC QN AUTO: 30.9 PG (ref 26–34)
MCHC RBC AUTO-ENTMCNC: 34.3 G/DL (ref 31–36)
MCV RBC AUTO: 90.1 FL (ref 80–100)
PDW BLD-RTO: 13.3 % (ref 12.4–15.4)
PLATELET # BLD: 210 K/UL (ref 135–450)
PMV BLD AUTO: 7.9 FL (ref 5–10.5)
POTASSIUM SERPL-SCNC: 4.4 MMOL/L (ref 3.5–5.1)
RBC # BLD: 4.71 M/UL (ref 4.2–5.9)
SODIUM BLD-SCNC: 139 MMOL/L (ref 136–145)
WBC # BLD: 17.8 K/UL (ref 4–11)

## 2021-10-17 PROCEDURE — 80048 BASIC METABOLIC PNL TOTAL CA: CPT

## 2021-10-17 PROCEDURE — 94760 N-INVAS EAR/PLS OXIMETRY 1: CPT

## 2021-10-17 PROCEDURE — 2580000003 HC RX 258: Performed by: HOSPITALIST

## 2021-10-17 PROCEDURE — 85027 COMPLETE CBC AUTOMATED: CPT

## 2021-10-17 PROCEDURE — 6370000000 HC RX 637 (ALT 250 FOR IP): Performed by: HOSPITALIST

## 2021-10-17 PROCEDURE — 6360000002 HC RX W HCPCS: Performed by: HOSPITALIST

## 2021-10-17 PROCEDURE — 6360000002 HC RX W HCPCS

## 2021-10-17 PROCEDURE — C9113 INJ PANTOPRAZOLE SODIUM, VIA: HCPCS | Performed by: HOSPITALIST

## 2021-10-17 PROCEDURE — 6360000002 HC RX W HCPCS: Performed by: INTERNAL MEDICINE

## 2021-10-17 PROCEDURE — 2580000003 HC RX 258: Performed by: INTERNAL MEDICINE

## 2021-10-17 PROCEDURE — 36415 COLL VENOUS BLD VENIPUNCTURE: CPT

## 2021-10-17 PROCEDURE — 6370000000 HC RX 637 (ALT 250 FOR IP): Performed by: INTERNAL MEDICINE

## 2021-10-17 PROCEDURE — 2580000003 HC RX 258

## 2021-10-17 RX ORDER — OXYCODONE HYDROCHLORIDE 15 MG/1
15 TABLET ORAL EVERY 6 HOURS PRN
Qty: 28 TABLET | Refills: 0 | Status: SHIPPED | OUTPATIENT
Start: 2021-10-17 | End: 2021-10-24

## 2021-10-17 RX ORDER — PROMETHAZINE HYDROCHLORIDE 12.5 MG/1
25 TABLET ORAL EVERY 6 HOURS PRN
Qty: 30 TABLET | Refills: 0 | Status: SHIPPED | OUTPATIENT
Start: 2021-10-17

## 2021-10-17 RX ADMIN — METHYLPREDNISOLONE SODIUM SUCCINATE 20 MG: 40 INJECTION, POWDER, FOR SOLUTION INTRAMUSCULAR; INTRAVENOUS at 06:54

## 2021-10-17 RX ADMIN — OXYCODONE HYDROCHLORIDE 15 MG: 10 TABLET ORAL at 13:49

## 2021-10-17 RX ADMIN — PANTOPRAZOLE SODIUM 40 MG: 40 INJECTION, POWDER, FOR SOLUTION INTRAVENOUS at 08:50

## 2021-10-17 RX ADMIN — ENOXAPARIN SODIUM 40 MG: 40 INJECTION SUBCUTANEOUS at 08:49

## 2021-10-17 RX ADMIN — Medication 12.5 MG: at 06:02

## 2021-10-17 RX ADMIN — OXYCODONE HYDROCHLORIDE 15 MG: 10 TABLET ORAL at 08:50

## 2021-10-17 RX ADMIN — SODIUM CHLORIDE, PRESERVATIVE FREE 10 ML: 5 INJECTION INTRAVENOUS at 08:51

## 2021-10-17 RX ADMIN — SODIUM CHLORIDE, PRESERVATIVE FREE 10 ML: 5 INJECTION INTRAVENOUS at 08:55

## 2021-10-17 RX ADMIN — ONDANSETRON 4 MG: 4 TABLET, ORALLY DISINTEGRATING ORAL at 05:37

## 2021-10-17 RX ADMIN — ONDANSETRON 4 MG: 2 INJECTION INTRAMUSCULAR; INTRAVENOUS at 13:49

## 2021-10-17 RX ADMIN — HYDROCODONE BITARTRATE AND ACETAMINOPHEN 1 TABLET: 5; 325 TABLET ORAL at 05:37

## 2021-10-17 ASSESSMENT — PAIN SCALES - GENERAL
PAINLEVEL_OUTOF10: 0
PAINLEVEL_OUTOF10: 5
PAINLEVEL_OUTOF10: 5
PAINLEVEL_OUTOF10: 0
PAINLEVEL_OUTOF10: 6
PAINLEVEL_OUTOF10: 4

## 2021-10-17 ASSESSMENT — PAIN DESCRIPTION - PROGRESSION
CLINICAL_PROGRESSION: GRADUALLY WORSENING
CLINICAL_PROGRESSION: NOT CHANGED

## 2021-10-17 ASSESSMENT — PAIN DESCRIPTION - LOCATION
LOCATION: ABDOMEN

## 2021-10-17 ASSESSMENT — PAIN DESCRIPTION - FREQUENCY
FREQUENCY: CONTINUOUS

## 2021-10-17 ASSESSMENT — PAIN DESCRIPTION - ORIENTATION
ORIENTATION: RIGHT;LOWER

## 2021-10-17 ASSESSMENT — PAIN DESCRIPTION - PAIN TYPE
TYPE: ACUTE PAIN

## 2021-10-17 ASSESSMENT — PAIN DESCRIPTION - ONSET
ONSET: ON-GOING

## 2021-10-17 ASSESSMENT — PAIN - FUNCTIONAL ASSESSMENT
PAIN_FUNCTIONAL_ASSESSMENT: PREVENTS OR INTERFERES SOME ACTIVE ACTIVITIES AND ADLS

## 2021-10-17 ASSESSMENT — PAIN DESCRIPTION - DESCRIPTORS
DESCRIPTORS: ACHING;DISCOMFORT
DESCRIPTORS: DISCOMFORT
DESCRIPTORS: ACHING;DISCOMFORT
DESCRIPTORS: DISCOMFORT

## 2021-10-17 NOTE — PROGRESS NOTES
Pt discharged to home. Patient requesting to walk to personal vehicle. Accompanied by mother. Transported in personal vehicle. Discharge instructions, Rx, and personal belongings given to pt. Explanation of discharge medications and instructions understood by verbal statement. No questions, comments or concerns at this time.        Electronically signed by Bhavesh Baker RN on 10/17/2021 at 3:45 PM

## 2021-10-17 NOTE — PROGRESS NOTES
Patient resting in bed and complaining of 6/10 back pain. Patient requested to contact Dr. Jonny Guerrero for PRN oral pain medication stronger than Norco. This RN perfectserved Dr. Jonny Guerrero. See new orders. Assessment completed and medication administered. See MAR. IV in  Right upper arm flushed and saline locked. Wife at bedside. Patient denies any additional requests at this time. Fall precautions in place, call light within reach, and bedside table nearby. Will continue to monitor and round on patient q 2 hours.        Electronically signed by Jossie Adhikari RN on 10/17/2021 at 9:43 AM

## 2021-10-17 NOTE — PROGRESS NOTES
Pt AAO x4. C/o pain in RLQ abd 7/10 on pain scale and nausea. Medicated with PRN Phenergan and Morphine. Assessment completed and charted. Pt states he was able to hold down all 3 meals today with pain and nausea better controlled. Denies any needs. Call light in reach. Will monitor.

## 2021-10-17 NOTE — PLAN OF CARE
Problem: Pain:  Goal: Pain level will decrease  Description: Pain level will decrease  Outcome: Ongoing  Goal: Control of acute pain  Description: Control of acute pain  Outcome: Ongoing  Goal: Control of chronic pain  Description: Control of chronic pain  Outcome: Ongoing   Pain/discomfort being managed with PRN analgesics per MD orders. Pt able to express presence and absence of pain and rate pain appropriately using numerical scale. Problem: Nausea/Vomiting:  Goal: Absence of nausea/vomiting  Description: Absence of nausea/vomiting  Outcome: Ongoing  Goal: Able to drink  Description: Able to drink  Outcome: Ongoing  Goal: Able to eat  Description: Able to eat  Outcome: Ongoing  Goal: Ability to achieve adequate nutritional intake will improve  Description: Ability to achieve adequate nutritional intake will improve  Outcome: Ongoing   Patient complaining of nausea, medicated with Zofran and Phenergan. Will continue to monitor.     Problem: Skin Integrity:  Goal: Will show no infection signs and symptoms  Description: Will show no infection signs and symptoms  Outcome: Ongoing  Goal: Absence of new skin breakdown  Description: Absence of new skin breakdown  Outcome: Ongoing

## 2021-10-17 NOTE — PROGRESS NOTES
INPATIENT CONSULTATION:    IDENTIFYING DATA/REASON FOR CONSULTATION   PATIENT:  Ama Abdalla  MRN:  [id number]  ADMIT DATE: 10/15/2021  TIME OF EVALUATION: 10/17/2021 10:37 AM  HOSPITAL STAY:   LOS: 2 days   CONSULTING PHYSICIAN: Raúl Gerard MD   REASON FOR CONSULTATION: abdominal pain     Subjective:    No acute events overnight   Abdominal pain ongoing   Having bowel movements   Tolerating PO   Continues to have nausea     MEDICATIONS   SCHEDULED:  pantoprazole, 40 mg, Daily   And  sodium chloride (PF), 10 mL, Daily  methylPREDNISolone, 20 mg, Q8H  sodium chloride flush, 5-40 mL, 2 times per day  enoxaparin, 40 mg, Daily      FLUIDS/DRIPS:     sodium chloride Stopped (10/16/21 2110)     PRNs: oxyCODONE, 15 mg, Q4H PRN  morphine, 2 mg, Q4H PRN  promethazine, 12.5 mg, Q6H PRN  melatonin, 6 mg, Nightly PRN  sodium chloride flush, 5-40 mL, PRN  sodium chloride, 25 mL, PRN  ondansetron, 4 mg, Q8H PRN   Or  ondansetron, 4 mg, Q6H PRN  polyethylene glycol, 17 g, Daily PRN  acetaminophen, 650 mg, Q6H PRN   Or  acetaminophen, 650 mg, Q6H PRN  HYDROcodone 5 mg - acetaminophen, 1 tablet, Q6H PRN      ALLERGIES:  No Known Allergies       PHYSICAL EXAM     Wt Readings from Last 3 Encounters:   10/17/21 211 lb 6.7 oz (95.9 kg)   10/06/20 199 lb (90.3 kg)   09/16/19 199 lb 3.2 oz (90.4 kg)     Temp Readings from Last 3 Encounters:   10/17/21 97.7 °F (36.5 °C) (Oral)   10/06/20 98.2 °F (36.8 °C) (Oral)   05/01/20 98.2 °F (36.8 °C) (Oral)     BP Readings from Last 3 Encounters:   10/17/21 (!) 148/69   10/06/20 (!) 141/86   05/01/20 126/78     Pulse Readings from Last 3 Encounters:   10/17/21 72   10/06/20 87   05/01/20 88      I/O last 3 completed shifts: In: 1379.4 [P.O.:1320;  I.V.:9.4; IV Piggyback:50]  Out: -       Physical Exam:  Gen: Resting in bed, NAD   CV: RRR no MRG   Pul: non labored, symmetric, clear bilaterally   Abd: Good bowel sounds throughout, soft, NT/ND, no masses, no HSM   Ext: No edema   Neuro: Following commands, speech normal   Skin: No jaundice, rashes or lesions     LABS AND IMAGING     Recent Results (from the past 24 hour(s))   Basic Metabolic Panel    Collection Time: 10/17/21  5:35 AM   Result Value Ref Range    Sodium 139 136 - 145 mmol/L    Potassium 4.4 3.5 - 5.1 mmol/L    Chloride 103 99 - 110 mmol/L    CO2 26 21 - 32 mmol/L    Anion Gap 10 3 - 16    Glucose 130 (H) 70 - 99 mg/dL    BUN 12 7 - 20 mg/dL    CREATININE 0.8 (L) 0.9 - 1.3 mg/dL    GFR Non-African American >60 >60    GFR African American >60 >60    Calcium 9.3 8.3 - 10.6 mg/dL   CBC    Collection Time: 10/17/21  5:35 AM   Result Value Ref Range    WBC 17.8 (H) 4.0 - 11.0 K/uL    RBC 4.71 4.20 - 5.90 M/uL    Hemoglobin 14.5 13.5 - 17.5 g/dL    Hematocrit 42.4 40.5 - 52.5 %    MCV 90.1 80.0 - 100.0 fL    MCH 30.9 26.0 - 34.0 pg    MCHC 34.3 31.0 - 36.0 g/dL    RDW 13.3 12.4 - 15.4 %    Platelets 392 883 - 395 K/uL    MPV 7.9 5.0 - 10.5 fL     Other Labs    Imaging  CT ABDOMEN PELVIS W IV CONTRAST Additional Contrast? None   Final Result   No acute abdominopelvic abnormality. US GALLBLADDER RUQ    (Results Pending)         ASSESSMENT AND RECOMMENDATIONS   Lieutenant Gonzalez is a 27 y.o. male who has no past medical history on file. He presents with the followin. Nausea, vomiting, abdominal pain       -CT 10/5/21 with long segment of abnormally thickened small bowel within the right lower quadrant, extending to the terminal ileum, suspicious for inflammatory bowel disease.  No obstruction or abscess. -LFTs and lipase unremarkable.   -Inflammatory markers normal   -was started on steroid as outpt due to concern for IBD     RECOMMENDATIONS:     Would send home on PO prednisone 40 mg daily and anti emetics as needed   Will plan for his outpatient colonoscopy as planned   Maintain a low residue diet   OK for discharge from GI standpoint     If you have any questions or need any further information, please feel free to contact anyone on our consult team.  Thank you for allowing us to participate in the care of [facility].     Erick Bahena MD   [address] [facility]

## 2021-10-17 NOTE — PLAN OF CARE
Problem: Pain:  Goal: Pain level will decrease  Description: Pain level will decrease  10/17/2021 0735 by Oscar Stearns RN  Outcome: Ongoing  Note: Pt assessed for pain. Pt denies any pain at this time. Will continue to monitor pt and assess for pain throughout rest of shift. 10/17/2021 0444 by Bushra Castillo RN  Outcome: Ongoing  Goal: Control of acute pain  Description: Control of acute pain  10/17/2021 0735 by Oscar Stearns RN  Outcome: Ongoing  Note: Pt assessed for pain. Pt denies any pain at this time. Will continue to monitor pt and assess for pain throughout rest of shift. 10/17/2021 0444 by Bushra Castillo RN  Outcome: Ongoing  Goal: Control of chronic pain  Description: Control of chronic pain  10/17/2021 0735 by Oscar Stearns RN  Outcome: Ongoing  Note: Pt assessed for pain. Pt denies any pain at this time. Will continue to monitor pt and assess for pain throughout rest of shift.    10/17/2021 0444 by Bushra Castillo RN  Outcome: Ongoing     Problem: Nausea/Vomiting:  Goal: Absence of nausea/vomiting  Description: Absence of nausea/vomiting  10/17/2021 0735 by Oscar Stearns RN  Outcome: Ongoing  10/17/2021 0444 by Bushra Castillo RN  Outcome: Ongoing  Goal: Able to drink  Description: Able to drink  10/17/2021 0735 by Oscar Stearns RN  Outcome: Ongoing  10/17/2021 0444 by Bushra Castillo RN  Outcome: Ongoing  Goal: Able to eat  Description: Able to eat  10/17/2021 0735 by Oscar Stearns RN  Outcome: Ongoing  10/17/2021 0444 by Bushra Castillo RN  Outcome: Ongoing  Goal: Ability to achieve adequate nutritional intake will improve  Description: Ability to achieve adequate nutritional intake will improve  10/17/2021 0735 by Oscar Stearns RN  Outcome: Ongoing  10/17/2021 0444 by Bushra Castillo RN  Outcome: Ongoing     Problem: Skin Integrity:  Goal: Will show no infection signs and symptoms  Description: Will show no infection signs and symptoms  10/17/2021 0735 by Caio Glass RN  Outcome: Ongoing  Note: Patient remains free from new signs and symptoms of infection during this shift. Infection prevention measures are in place. Will continue to monitor for alterations in patient condition throughout the shift. 10/17/2021 0444 by Kun Fajardo RN  Outcome: Ongoing  Goal: Absence of new skin breakdown  Description: Absence of new skin breakdown  10/17/2021 0735 by Caio Glass RN  Outcome: Ongoing  Note: Patient skin condition and mucus membrane integrity remain unchanged during this shift. Skin breakdown prevention interventions are in place. Will continue to monitor and assess.    10/17/2021 0444 by Kun Fajardo RN  Outcome: Ongoing

## 2021-10-18 NOTE — DISCHARGE SUMMARY
Hospital Medicine Discharge Summary      Patient ID: Kayce Menezes , [id number]     Patient's PCP: Carlos Alberto Freitas MD    Admit Date: 10/15/2021     Discharge Date: 10/17/2021      Admitting Physician: Hermila Vegas MD    Discharge Physician: Carol Calvillo MD     Discharge Diagnoses: Active Hospital Problems    Diagnosis Date Noted    Intractable nausea and vomiting [R11.2] 10/15/2021         The patient was seen and examined on the day of discharge and this discharge summary is in conjunction with any daily progress note from day of discharge. HOSPITAL COURSE    Patient demographics:  The patient  Kayce Menezes is a 27 y.o. male      Significant past medical history:       Patient Active Problem List   Diagnosis    Intractable nausea and vomiting            Presenting symptoms:  Abdominal Pain     Diagnostic workup:            CT ABDOMEN PELVIS W IV CONTRAST         CONSULTS DURING ADMISSION :   IP CONSULT TO GI        Patient was diagnosed with: Intractable nausea,   vomiting,   abdominal pain        Treatment while inpatient:  Pt presented to hospital with nausea and vomiting which has been persistently going on for the past 2 weeks. Patient is suspected for Crohn's disease. GI was consulted and the patient's further diagnostic and therapeutic work-up will be done as an outpatient. Patient is being started empirically on steroids. Patient will be provided with pain medication to control his pain.                            Discharge Condition:  stable      Discharged to:  Home      Activity:   as tolerated:     Follow Up:  Patient is to follow-up with GI for colonoscopy and right upper quadrant ultrasound                  Labs:  For convenience and continuity at follow-up the following most recent labs are provided:      CBC:   Lab Results   Component Value Date    WBC 17.8 10/17/2021    HGB 14.5 10/17/2021    HCT 42.4 10/17/2021     10/17/2021       RENAL:   Lab Results   Component Value Date     10/17/2021    K 4.4 10/17/2021    K 4.3 10/16/2021     10/17/2021    CO2 26 10/17/2021    BUN 12 10/17/2021    CREATININE 0.8 10/17/2021           Discharge Medications:    [facility]   Home Medication Instructions [id number]    Printed on:10/17/21 3087   Medication Information                      acetaminophen (TYLENOL) 500 MG tablet  Take 500 mg by mouth every 6 hours as needed for Pain             oxyCODONE (OXY-IR) 15 MG immediate release tablet  Take 1 tablet by mouth every 6 hours as needed for Pain for up to 7 days. predniSONE (DELTASONE) 10 MG tablet  Take 40 mg by mouth daily             promethazine (PHENERGAN) 12.5 MG tablet  Take 2 tablets by mouth every 6 hours as needed for Nausea                    Time Spent on discharge is more than 30 min in the examination, evaluation, counseling and review of medications and discharge plan. Signed:  Krupa Alvarez MD   10/17/2021      Thank you Javier Prince MD for the opportunity to be involved in this patient's care. If you have any questions or concerns please feel free to contact me at [phone]. This note was transcribed using [id number]. Please disregard any translational errors.

## 2024-03-28 ENCOUNTER — HOSPITAL ENCOUNTER (EMERGENCY)
Age: 29
Discharge: HOME | End: 2024-03-28
Attending: EMERGENCY MEDICINE
Payer: COMMERCIAL

## 2024-03-28 VITALS
BODY MASS INDEX: 26.05 KG/M2 | RESPIRATION RATE: 16 BRPM | SYSTOLIC BLOOD PRESSURE: 123 MMHG | OXYGEN SATURATION: 99 % | TEMPERATURE: 98.3 F | HEART RATE: 76 BPM | DIASTOLIC BLOOD PRESSURE: 83 MMHG | WEIGHT: 214 LBS

## 2024-03-28 DIAGNOSIS — L02.212 ABSCESS OF BACK: Primary | ICD-10-CM

## 2024-03-28 PROCEDURE — 99283 EMERGENCY DEPT VISIT LOW MDM: CPT

## 2024-03-28 PROCEDURE — 2500000003 HC RX 250 WO HCPCS: Performed by: EMERGENCY MEDICINE

## 2024-03-28 PROCEDURE — 6370000000 HC RX 637 (ALT 250 FOR IP): Performed by: EMERGENCY MEDICINE

## 2024-03-28 PROCEDURE — 10060 I&D ABSCESS SIMPLE/SINGLE: CPT

## 2024-03-28 RX ORDER — LIDOCAINE HYDROCHLORIDE AND EPINEPHRINE 10; 10 MG/ML; UG/ML
20 INJECTION, SOLUTION INFILTRATION; PERINEURAL ONCE
Status: COMPLETED | OUTPATIENT
Start: 2024-03-28 | End: 2024-03-28

## 2024-03-28 RX ORDER — CEPHALEXIN 500 MG/1
500 CAPSULE ORAL ONCE
Status: COMPLETED | OUTPATIENT
Start: 2024-03-28 | End: 2024-03-28

## 2024-03-28 RX ORDER — SULFAMETHOXAZOLE AND TRIMETHOPRIM 800; 160 MG/1; MG/1
1 TABLET ORAL ONCE
Status: COMPLETED | OUTPATIENT
Start: 2024-03-28 | End: 2024-03-28

## 2024-03-28 RX ORDER — SULFAMETHOXAZOLE AND TRIMETHOPRIM 800; 160 MG/1; MG/1
1 TABLET ORAL 2 TIMES DAILY
Qty: 14 TABLET | Refills: 0 | Status: SHIPPED | OUTPATIENT
Start: 2024-03-28 | End: 2024-04-04

## 2024-03-28 RX ORDER — CEPHALEXIN 500 MG/1
500 CAPSULE ORAL 4 TIMES DAILY
Qty: 28 CAPSULE | Refills: 0 | Status: SHIPPED | OUTPATIENT
Start: 2024-03-28 | End: 2024-04-04

## 2024-03-28 RX ADMIN — CEPHALEXIN 500 MG: 500 CAPSULE ORAL at 23:09

## 2024-03-28 RX ADMIN — LIDOCAINE HYDROCHLORIDE,EPINEPHRINE BITARTRATE 20 ML: 10; .01 INJECTION, SOLUTION INFILTRATION; PERINEURAL at 23:10

## 2024-03-28 RX ADMIN — SULFAMETHOXAZOLE AND TRIMETHOPRIM 1 TABLET: 800; 160 TABLET ORAL at 23:09

## 2024-03-28 ASSESSMENT — PAIN DESCRIPTION - PAIN TYPE: TYPE: ACUTE PAIN

## 2024-03-28 ASSESSMENT — PAIN DESCRIPTION - DESCRIPTORS: DESCRIPTORS: SORE

## 2024-03-28 ASSESSMENT — PAIN SCALES - GENERAL
PAINLEVEL_OUTOF10: 2
PAINLEVEL_OUTOF10: 2

## 2024-03-28 ASSESSMENT — PAIN DESCRIPTION - LOCATION: LOCATION: BACK

## 2024-03-29 NOTE — ED PROVIDER NOTES
[facility] EMERGENCY DEPT VISIT      Patient Identification  Logan Wachter is a 28 y.o. male.    Chief Complaint   Cyst (On back)      History of Present Illness:    History was obtained from patient.  Limitations to history:none  This is a  28 y.o. male who presents ambulatory  to the ED with complaints of painful swelling left flank area for last 2 days. Started as a pimple and has gotten larger. No fever. No drainage.. no DM. No immunosuppressant therapy    No past medical history on file.    Past Surgical History:   Procedure Laterality Date    SHOULDER SURGERY         No current facility-administered medications for this encounter.    Current Outpatient Medications:     cephALEXin (KEFLEX) 500 MG capsule, Take 1 capsule by mouth 4 times daily for 7 days, Disp: 28 capsule, Rfl: 0    sulfamethoxazole-trimethoprim (BACTRIM DS;SEPTRA DS) 800-160 MG per tablet, Take 1 tablet by mouth 2 times daily for 7 days, Disp: 14 tablet, Rfl: 0    promethazine (PHENERGAN) 12.5 MG tablet, Take 2 tablets by mouth every 6 hours as needed for Nausea, Disp: 30 tablet, Rfl: 0    predniSONE (DELTASONE) 10 MG tablet, Take 40 mg by mouth daily, Disp: , Rfl:     acetaminophen (TYLENOL) 500 MG tablet, Take 500 mg by mouth every 6 hours as needed for Pain, Disp: , Rfl:     No Known Allergies    Social History     Socioeconomic History    Marital status: Single     Spouse name: Not on file    Number of children: Not on file    Years of education: Not on file    Highest education level: Not on file   Occupational History    Not on file   Tobacco Use    Smoking status: Never    Smokeless tobacco: Current     Types: Snuff   Substance and Sexual Activity    Alcohol use: Yes     Comment: 5-6 drinks weekly    Drug use: Never    Sexual activity: Not on file   Other Topics Concern    Not on file   Social History Narrative    Not on file     Social Determinants of Health     Financial Resource Strain: Not on file   Food Insecurity: Not on file

## 2024-03-29 NOTE — ED NOTES
Pt arrived complaining of a cyst on his back since yesterday that has gown larger today. Pt is alert and oriented, respirations appear even and unlabored. No distress noted. Physician at bedside.